# Patient Record
Sex: FEMALE | Race: WHITE | Employment: FULL TIME
[De-identification: names, ages, dates, MRNs, and addresses within clinical notes are randomized per-mention and may not be internally consistent; named-entity substitution may affect disease eponyms.]

---

## 2022-06-14 ENCOUNTER — OFFICE VISIT (OUTPATIENT)
Dept: INTERNAL MEDICINE CLINIC | Facility: CLINIC | Age: 37
End: 2022-06-14
Payer: COMMERCIAL

## 2022-06-14 VITALS
HEART RATE: 85 BPM | HEIGHT: 65 IN | BODY MASS INDEX: 48.82 KG/M2 | TEMPERATURE: 98.8 F | WEIGHT: 293 LBS | SYSTOLIC BLOOD PRESSURE: 160 MMHG | DIASTOLIC BLOOD PRESSURE: 100 MMHG | OXYGEN SATURATION: 96 %

## 2022-06-14 DIAGNOSIS — Z76.89 ENCOUNTER TO ESTABLISH CARE: ICD-10-CM

## 2022-06-14 DIAGNOSIS — R06.81 WITNESSED APNEIC SPELLS: ICD-10-CM

## 2022-06-14 DIAGNOSIS — Z12.4 SCREENING FOR CERVICAL CANCER: ICD-10-CM

## 2022-06-14 DIAGNOSIS — E66.01 MORBID OBESITY WITH BMI OF 50.0-59.9, ADULT (HCC): ICD-10-CM

## 2022-06-14 DIAGNOSIS — Z83.49 FAMILY HISTORY OF THYROID DISEASE: ICD-10-CM

## 2022-06-14 DIAGNOSIS — R53.82 CHRONIC FATIGUE: ICD-10-CM

## 2022-06-14 DIAGNOSIS — R06.83 LOUD SNORING: ICD-10-CM

## 2022-06-14 DIAGNOSIS — Z30.41 ENCOUNTER FOR BIRTH CONTROL PILLS MAINTENANCE: ICD-10-CM

## 2022-06-14 DIAGNOSIS — I10 UNCONTROLLED HYPERTENSION: Primary | ICD-10-CM

## 2022-06-14 DIAGNOSIS — Z82.49 FAMILY HISTORY OF HEART DISEASE: ICD-10-CM

## 2022-06-14 PROCEDURE — 99204 OFFICE O/P NEW MOD 45 MIN: CPT | Performed by: PHYSICIAN ASSISTANT

## 2022-06-14 RX ORDER — OMEPRAZOLE 20 MG/1
20 CAPSULE, DELAYED RELEASE ORAL DAILY
COMMUNITY

## 2022-06-14 RX ORDER — AMLODIPINE BESYLATE 10 MG/1
10 TABLET ORAL DAILY
Qty: 90 TABLET | Refills: 3 | Status: SHIPPED | OUTPATIENT
Start: 2022-06-14 | End: 2022-07-26 | Stop reason: CLARIF

## 2022-06-14 RX ORDER — NORETHINDRONE 0.35 MG
1 KIT ORAL DAILY
COMMUNITY
Start: 2022-06-10 | End: 2022-06-14 | Stop reason: SDUPTHER

## 2022-06-14 RX ORDER — NORETHINDRONE 0.35 MG
1 KIT ORAL DAILY
Qty: 90 TABLET | Refills: 0 | Status: SHIPPED | OUTPATIENT
Start: 2022-06-14 | End: 2022-06-16 | Stop reason: SDUPTHER

## 2022-06-14 RX ORDER — LISINOPRIL 40 MG/1
40 TABLET ORAL DAILY
COMMUNITY
Start: 2022-03-19 | End: 2022-07-26 | Stop reason: CLARIF

## 2022-06-14 RX ORDER — CETIRIZINE HYDROCHLORIDE 10 MG/1
1 CAPSULE, LIQUID FILLED ORAL DAILY
COMMUNITY

## 2022-06-14 RX ORDER — AMLODIPINE BESYLATE 10 MG/1
10 TABLET ORAL DAILY
COMMUNITY
Start: 2022-04-27 | End: 2022-06-14 | Stop reason: SDUPTHER

## 2022-06-14 RX ORDER — VALSARTAN 320 MG/1
320 TABLET ORAL DAILY
Qty: 30 TABLET | Refills: 3 | Status: SHIPPED | OUTPATIENT
Start: 2022-06-14 | End: 2022-09-27 | Stop reason: SDUPTHER

## 2022-06-14 RX ORDER — LISINOPRIL 40 MG/1
40 TABLET ORAL DAILY
Qty: 90 TABLET | Refills: 3 | Status: CANCELLED | OUTPATIENT
Start: 2022-06-14

## 2022-06-14 ASSESSMENT — ENCOUNTER SYMPTOMS
SHORTNESS OF BREATH: 0
APNEA: 1

## 2022-06-14 ASSESSMENT — PATIENT HEALTH QUESTIONNAIRE - PHQ9
2. FEELING DOWN, DEPRESSED OR HOPELESS: 0
SUM OF ALL RESPONSES TO PHQ9 QUESTIONS 1 & 2: 0
SUM OF ALL RESPONSES TO PHQ QUESTIONS 1-9: 0
1. LITTLE INTEREST OR PLEASURE IN DOING THINGS: 0
SUM OF ALL RESPONSES TO PHQ QUESTIONS 1-9: 0

## 2022-06-14 NOTE — PATIENT INSTRUCTIONS
Discontinue Lisinopril 40 mg daily  Start Diovan 320 mg daily in place of Lisinopril  Monitor blood pressure at least 3 times/week and keep record to bring to next appointment  Reviewed the widespread damage that untreated sleep apnea can cause to her health including metabolic issues like weight problems  Suggest OTC aspirin 81 mg daily  Lengthy  regarding dietary changes needed to accomplish weight loss such as elimination of sugary beverages opting instead for zero sodas, light lemonade, or flavored sodas, avoidance of late night meals/snacks (try not to eat past 7 pm), limiting meals to 1 type of carbohydrate per meal, portion control, limited fast foods/eating out, and incorporating protein and/of fiber into meals regularly to help sustain her longer

## 2022-06-14 NOTE — PROGRESS NOTES
refer  Marialuisa Moore (:  1985) is a 40 y.o. female,New patient, here for evaluation of the following chief complaint(s):  Establish Care (Pt wants to discuss possible weight loss medications. )         ASSESSMENT/PLAN:  1. Uncontrolled hypertension  -     amLODIPine (NORVASC) 10 MG tablet; Take 1 tablet by mouth daily, Disp-90 tablet, R-3Normal  -     valsartan (DIOVAN) 320 MG tablet; Take 1 tablet by mouth daily, Disp-30 tablet, R-3Normal  -     [facility]  -     CBC with Auto Differential; Future  -     Comprehensive Metabolic Panel; Future  -     Lipid Panel; Future  -     CT CARDIAC CALCIUM SCORING; Future  2. Encounter to establish care  3. Encounter for birth control pills maintenance  -     SHAROBEL 0.35 MG tablet; Take 1 tablet by mouth daily, Disp-90 tablet, R-0, DAWNormal  4. Screening for cervical cancer  -     [address]  5. Loud snoring  -     [facility]  6. Witnessed apneic spells  -     [facility]  7. Morbid obesity with BMI of 50.0-59.9, adult ([address]  -      [facility]  -     Hemoglobin A1C; Future  -     TSH; Future  -     CT CARDIAC CALCIUM SCORING; Future  8. Family history of heart disease  -     Lipid Panel; Future  -     CT CARDIAC CALCIUM SCORING; Future  9. Chronic fatigue  -     [address] [facility]  -     CBC with Auto Differential; Future  -     TSH; Future  -     T4, Free; Future  -     Vitamin B12; Future  -     Vitamin D 25 Hydroxy; Future  10. Family history of thyroid disease  -     TSH;  Future  -     T4, Free; Future      Patient Instructions   Discontinue Lisinopril 40 mg daily  Start Diovan 320 mg daily in place of Lisinopril  Monitor blood pressure at least 3 times/week and keep record to bring to next appointment  Reviewed the widespread damage that untreated sleep apnea can cause to her health including metabolic issues like weight problems  Suggest OTC aspirin 81 mg daily  Lengthy  regarding dietary changes needed to accomplish weight loss such as elimination of sugary beverages opting instead for zero sodas, light lemonade, or flavored sodas, avoidance of late night meals/snacks (try not to eat past 7 pm), limiting meals to 1 type of carbohydrate per meal, portion control, limited fast foods/eating out, and incorporating protein and/of fiber into meals regularly to help sustain her longer          Return in about 2 months (around 8/14/2022) for routine f/u. Subjective   SUBJECTIVE/OBJECTIVE:  HPI  The patient is a 40 y.o. female who is seen for follow-up of hypertension. She is not exercising and is not adherent to low salt diet. Daily caffiene intake: a known amount (none). Current medication regimen consists of: Lisinopril 40 mg daily + Amlodipine 10 mg daily. Blood pressure is not measured at home. BP Readings from Last 3 Encounters:   06/14/22 (!) 150/94       Additional concerns addressed today include obesity. She describes lowest weight post having children was 200 pounds before meeting her long term boyfriend and eating out more often. She recalls working 2 jobs that were physical and not having time to eat much at the time she weighed 200 lbs. 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  She reports weight has been stable the past year or so with 5 pounds gained and lost repeatedly. Her recent efforts have involved increasing her activity level by adding more steps into her day but admits no energy to exercise due to chronic fatigue. Boyfriend reports loud snoring, witnessed apneic episodes (6-8 second pauses), and frequent dozing off during the day - riding in car or watching TV. Patient describes waking up feeling un-refreshed.       Review of Systems Constitutional: Positive for fatigue (chronic). Negative for unexpected weight change. Respiratory: Positive for apnea. Negative for shortness of breath. Cardiovascular: Negative for chest pain, palpitations and leg swelling. Neurological: Negative for dizziness and headaches. Psychiatric/Behavioral: Negative for sleep disturbance. BP (!) 150/94 (Site: Left Upper Arm, Position: Sitting, Cuff Size: Large Adult)   Pulse 85   Temp 98.8 °F (37.1 °C) (Temporal)   Ht 5' 5\" (1.651 m)   Wt (!) 338 lb (153.3 kg)   SpO2 96%   BMI 56.25 kg/m²     BP (!) 160/100   Pulse 85   Temp 98.8 °F (37.1 °C) (Temporal)   Ht 5' 5\" (1.651 m)   Wt (!) 338 lb (153.3 kg)   SpO2 96%   BMI 56.25 kg/m²       Objective   Physical Exam  Constitutional:       Appearance: Normal appearance. She is obese. HENT:      Head: Normocephalic and atraumatic. Eyes:      Conjunctiva/sclera: Conjunctivae normal.      Pupils: Pupils are equal, round, and reactive to light. Neck:      Vascular: No carotid bruit. Cardiovascular:      Rate and Rhythm: Normal rate and regular rhythm. Heart sounds: Normal heart sounds. Pulmonary:      Effort: Pulmonary effort is normal.      Breath sounds: Normal breath sounds. Musculoskeletal:         General: Normal range of motion. Cervical back: Normal range of motion. Skin:     General: Skin is warm and dry. Neurological:      Mental Status: She is alert and oriented to person, place, and time. Psychiatric:         Mood and Affect: Mood normal.         Behavior: Behavior normal.         Thought Content: Thought content normal.         Judgment: Judgment normal.        [place] Sleepiness Scale  Total Score: 19    On this date 6/14/2022 I have spent 45 minutes reviewing previous notes, test results and face to face with the patient discussing the diagnosis and importance of compliance with the treatment plan as well as documenting on the day of the visit.       An no

## 2022-06-14 NOTE — Clinical Note
Please call patient and do [place] Sleepines Scale over the phone. Also contact radiology and find out what the terminology for CT Heart now? I cannot find it anywhere.

## 2022-06-15 ENCOUNTER — TELEPHONE (OUTPATIENT)
Dept: INTERNAL MEDICINE CLINIC | Facility: CLINIC | Age: 37
End: 2022-06-15

## 2022-06-15 DIAGNOSIS — Z30.41 ENCOUNTER FOR BIRTH CONTROL PILLS MAINTENANCE: ICD-10-CM

## 2022-06-15 NOTE — TELEPHONE ENCOUNTER
Received notice from [facility] that Sharobel 0.35 mg tablets are on backorder and that do no not stock it at their warehouse. They are requesting a replacement drug or for us to send to a different pharmacy.

## 2022-06-16 RX ORDER — ACETAMINOPHEN AND CODEINE PHOSPHATE 120; 12 MG/5ML; MG/5ML
1 SOLUTION ORAL DAILY
Qty: 90 TABLET | Refills: 0 | Status: SHIPPED | OUTPATIENT
Start: 2022-06-16

## 2022-06-16 NOTE — TELEPHONE ENCOUNTER
Adolfo Curtis, it looks like the patient requested this particular version. Please notify her of this feedback and advise that she can call around to various pharmacies and see if anyone has it in stock or can get it for her. If so, she can have prescription transferred.

## 2022-06-16 NOTE — TELEPHONE ENCOUNTER
Pt states that her insurance only pays for her to use [facility]. She would like to know if you can change the RX. She has only been on this medication for about 8-10 months and has never had any issues with any birth control medications.

## 2022-06-27 ENCOUNTER — NURSE ONLY (OUTPATIENT)
Dept: INTERNAL MEDICINE CLINIC | Facility: CLINIC | Age: 37
End: 2022-06-27

## 2022-06-27 DIAGNOSIS — I10 UNCONTROLLED HYPERTENSION: ICD-10-CM

## 2022-06-27 DIAGNOSIS — E66.01 MORBID OBESITY WITH BMI OF 50.0-59.9, ADULT (HCC): ICD-10-CM

## 2022-06-27 DIAGNOSIS — Z82.49 FAMILY HISTORY OF HEART DISEASE: ICD-10-CM

## 2022-06-27 DIAGNOSIS — Z83.49 FAMILY HISTORY OF THYROID DISEASE: ICD-10-CM

## 2022-06-27 DIAGNOSIS — R53.82 CHRONIC FATIGUE: ICD-10-CM

## 2022-06-27 LAB
ALBUMIN SERPL-MCNC: 3.6 G/DL (ref 3.5–5)
ALBUMIN/GLOB SERPL: 0.9 {RATIO} (ref 1.2–3.5)
ALP SERPL-CCNC: 55 U/L (ref 50–136)
ALT SERPL-CCNC: 45 U/L (ref 12–65)
ANION GAP SERPL CALC-SCNC: 8 MMOL/L (ref 7–16)
AST SERPL-CCNC: 30 U/L (ref 15–37)
BASOPHILS # BLD: 0 K/UL (ref 0–0.2)
BASOPHILS NFR BLD: 1 % (ref 0–2)
BILIRUB SERPL-MCNC: 0.6 MG/DL (ref 0.2–1.1)
BUN SERPL-MCNC: 8 MG/DL (ref 6–23)
CALCIUM SERPL-MCNC: 8.9 MG/DL (ref 8.3–10.4)
CHLORIDE SERPL-SCNC: 105 MMOL/L (ref 98–107)
CHOLEST SERPL-MCNC: 163 MG/DL
CO2 SERPL-SCNC: 26 MMOL/L (ref 21–32)
CREAT SERPL-MCNC: 1 MG/DL (ref 0.6–1)
DIFFERENTIAL METHOD BLD: ABNORMAL
EOSINOPHIL # BLD: 0.2 K/UL (ref 0–0.8)
EOSINOPHIL NFR BLD: 3 % (ref 0.5–7.8)
ERYTHROCYTE [DISTWIDTH] IN BLOOD BY AUTOMATED COUNT: 15.1 % (ref 11.9–14.6)
GLOBULIN SER CALC-MCNC: 3.9 G/DL (ref 2.3–3.5)
GLUCOSE SERPL-MCNC: 105 MG/DL (ref 65–100)
HCT VFR BLD AUTO: 43.5 % (ref 35.8–46.3)
HDLC SERPL-MCNC: 32 MG/DL (ref 40–60)
HDLC SERPL: 5.1 {RATIO}
HGB BLD-MCNC: 13 G/DL (ref 11.7–15.4)
IMM GRANULOCYTES # BLD AUTO: 0 K/UL (ref 0–0.5)
IMM GRANULOCYTES NFR BLD AUTO: 0 % (ref 0–5)
LDLC SERPL CALC-MCNC: 106.2 MG/DL
LYMPHOCYTES # BLD: 2.3 K/UL (ref 0.5–4.6)
LYMPHOCYTES NFR BLD: 36 % (ref 13–44)
MCH RBC QN AUTO: 24.7 PG (ref 26.1–32.9)
MCHC RBC AUTO-ENTMCNC: 29.9 G/DL (ref 31.4–35)
MCV RBC AUTO: 82.5 FL (ref 79.6–97.8)
MONOCYTES # BLD: 0.6 K/UL (ref 0.1–1.3)
MONOCYTES NFR BLD: 9 % (ref 4–12)
NEUTS SEG # BLD: 3.3 K/UL (ref 1.7–8.2)
NEUTS SEG NFR BLD: 52 % (ref 43–78)
NRBC # BLD: 0 K/UL (ref 0–0.2)
PLATELET # BLD AUTO: 303 K/UL (ref 150–450)
PMV BLD AUTO: 11 FL (ref 9.4–12.3)
POTASSIUM SERPL-SCNC: 4.4 MMOL/L (ref 3.5–5.1)
PROT SERPL-MCNC: 7.5 G/DL (ref 6.3–8.2)
RBC # BLD AUTO: 5.27 M/UL (ref 4.05–5.2)
SODIUM SERPL-SCNC: 139 MMOL/L (ref 136–145)
T4 FREE SERPL-MCNC: 1.1 NG/DL (ref 0.78–1.46)
TRIGL SERPL-MCNC: 124 MG/DL (ref 35–150)
TSH, 3RD GENERATION: 1.19 UIU/ML (ref 0.36–3.74)
VIT B12 SERPL-MCNC: 223 PG/ML (ref 193–986)
VLDLC SERPL CALC-MCNC: 24.8 MG/DL (ref 6–23)
WBC # BLD AUTO: 6.4 K/UL (ref 4.3–11.1)

## 2022-06-28 LAB
25(OH)D3 SERPL-MCNC: 16.5 NG/ML (ref 30–100)
EST. AVERAGE GLUCOSE BLD GHB EST-MCNC: 148 MG/DL
HBA1C MFR BLD: 6.8 % (ref 4.2–6.3)

## 2022-06-29 ENCOUNTER — HOSPITAL ENCOUNTER (OUTPATIENT)
Dept: CT IMAGING | Age: 37
Discharge: HOME | End: 2022-07-02
Payer: COMMERCIAL

## 2022-06-29 DIAGNOSIS — I10 UNCONTROLLED HYPERTENSION: ICD-10-CM

## 2022-06-29 DIAGNOSIS — E66.01 MORBID OBESITY WITH BMI OF 50.0-59.9, ADULT (HCC): ICD-10-CM

## 2022-06-29 DIAGNOSIS — Z82.49 FAMILY HISTORY OF HEART DISEASE: ICD-10-CM

## 2022-06-29 PROCEDURE — 75571 CT HRT W/O DYE W/CA TEST: CPT

## 2022-07-05 ENCOUNTER — OFFICE VISIT (OUTPATIENT)
Dept: GYNECOLOGY | Age: 37
End: 2022-07-05
Payer: COMMERCIAL

## 2022-07-05 VITALS
SYSTOLIC BLOOD PRESSURE: 132 MMHG | WEIGHT: 293 LBS | DIASTOLIC BLOOD PRESSURE: 78 MMHG | BODY MASS INDEX: 48.82 KG/M2 | HEIGHT: 65 IN

## 2022-07-05 DIAGNOSIS — Z12.4 SCREENING FOR MALIGNANT NEOPLASM OF CERVIX: ICD-10-CM

## 2022-07-05 DIAGNOSIS — Z01.419 WELL WOMAN EXAM WITH ROUTINE GYNECOLOGICAL EXAM: Primary | ICD-10-CM

## 2022-07-05 PROCEDURE — 99385 PREV VISIT NEW AGE 18-39: CPT | Performed by: OBSTETRICS & GYNECOLOGY

## 2022-07-05 RX ORDER — CHOLECALCIFEROL (VITAMIN D3) 1250 MCG
CAPSULE ORAL
COMMUNITY

## 2022-07-05 RX ORDER — UBIDECARENONE 75 MG
50 CAPSULE ORAL DAILY
COMMUNITY

## 2022-07-05 NOTE — PROGRESS NOTES
Sally Marino  is a 40 y.o. No obstetric history on file. who is here for an annual exam.  Complaints: Been getting BC on line for irregular periods. More recently she is seeing a PCP who wrote a prescription for birth control pills. She is also being worked up for sleep apnea. Health Maintenance:    Mammogram:  Bone Density  Colonoscopy  Pap Smear: At least 5 years ago          History  No flowsheet data found. Past Medical History:   Diagnosis Date    GERD (gastroesophageal reflux disease)     Hypertension     Perennial allergic rhinitis with seasonal variation      Past Surgical History:   Procedure Laterality Date     SECTION      OTHER SURGICAL HISTORY  2007    Staph infection due to C section incision opening; but on wound vac     Current Outpatient Medications on File Prior to Visit   Medication Sig Dispense Refill    Cholecalciferol (VITAMIN D3) 1.25 MG (84188 UT) CAPS Take by mouth      vitamin B-12 (CYANOCOBALAMIN) 100 MCG tablet Take 50 mcg by mouth daily      norethindrone (SHAROBEL) 0.35 MG tablet Take 1 tablet by mouth daily 90 tablet 0    Cetirizine HCl (ZYRTEC ALLERGY) 10 MG CAPS Take 1 capsule by mouth daily      omeprazole (PRILOSEC) 20 MG delayed release capsule Take 20 mg by mouth daily      amLODIPine (NORVASC) 10 MG tablet Take 1 tablet by mouth daily 90 tablet 3    valsartan (DIOVAN) 320 MG tablet Take 1 tablet by mouth daily 30 tablet 3    lisinopril (PRINIVIL;ZESTRIL) 40 MG tablet Take 40 mg by mouth daily (Patient not taking: Reported on 2022)       No current facility-administered medications on file prior to visit.      No Known Allergies  Social History     Tobacco Use    Smoking status: Never Smoker    Smokeless tobacco: Never Used   Substance Use Topics    Alcohol use: Not Currently     Family History   Problem Relation Age of Onset    Hypertension Mother     Thyroid Disease Mother         Hypothyroidism    Hypertension Father     Coronary Art Dis Father 62    Hypertension Maternal Grandmother     Thyroid Disease Maternal Grandmother         Hypothyroidism    Hypertension Maternal Grandfather     Hypertension Paternal Aunt     Hypertension Paternal Uncle            Review of Systems  All ROS negative except what's noted in HPI    Constitutional:  Denies weight gain, unexplained weight loss or heat or cold intolerance  ENT: Denies change in vision, change in hearing, frequent headaches  Cardiovascular:  Denies chest pain, swelling in legs or feet, shortness of breath when lying flat  Respiratory:  Denies shortness of breath, cough greater than 2 weeks or coughing up blood  Gastro: Denies diarrhea greater than 2 weeks, rectal bleeding, bloody stools, heartburn, or constipation  :  Denies blood in urine, getting up more than twice at night to urinate, dysuria or incontinence  Breast:  Denies nipple discharge, masses or pain  Skin:  Denies rash greater than 2 weeks, change in moles  Musculoskeletal/Neuro:  Denies joint pain, muscle weakness, seizures, loss of balance or frequent falls  Psych:  Denies frequent crying spells or severe anxiety  Heme:  Denies easy bruising, bleeding gums, frequent nosebleeds or swollen lymph nodes  GYN:  Denies bleeding or spotting between menses, heavy menses, menses longer than 7 days, pain with sex, severe menstrual cramps. Social:  Feels safe in her home. Denies ever having been threatened or hit by a family member                  Physical Exam  Blood pressure 132/78, height 5' 5\" (1.651 m), weight (!) 342 lb (155.1 kg), last menstrual period 06/01/2022. Body mass index is 56.91 kg/m². Lab Results   Component Value Date/Time    HGB 13.0 06/27/2022 01:47 PM      @[employer]([id number])@  @[employer]([id number])@    [facility] unremarkable. EOMI. THOMAS. Sclera non-icteric. Neck is supple without thyromegaly or nodes. Chest clear to auscultation. Bilateral breath sounds equal.    Heart regular rate and rhythm with no murmur, rub or gallop. Breast exam reveals no masses or nipple discharge. No axillary notes are palpable. Abdomen is benign without organomegally. BUS is normal.    Cervix is  present. Pap smear was performed. Bimanual exam reveals no masses. Assessment  40 y.o. No obstetric history on file. for annual exam.  Encounter Diagnoses   Name Primary?     Well woman exam with routine gynecological exam Yes    Screening for malignant neoplasm of cervix        Plan  Bob Brady was seen today for gynecologic exam.    Diagnoses and all orders for this visit:    Well woman exam with routine gynecological exam    Screening for malignant neoplasm of cervix  -     PAP LB, Reflex HPV ASCUS        pap smear done  return annually or prn

## 2022-07-06 LAB
CYTOLOGIST CVX/VAG CYTO: NORMAL
CYTOLOGY CVX/VAG DOC THIN PREP: NORMAL
HPV REFLEX: NORMAL
Lab: NORMAL
PATH REPORT.FINAL DX SPEC: NORMAL
STAT OF ADQ CVX/VAG CYTO-IMP: NORMAL

## 2022-07-13 ENCOUNTER — HOSPITAL ENCOUNTER (OUTPATIENT)
Dept: SLEEP CENTER | Age: 37
Discharge: HOME | End: 2022-07-13
Payer: COMMERCIAL

## 2022-07-13 PROCEDURE — 95811 POLYSOM 6/>YRS CPAP 4/> PARM: CPT

## 2022-07-25 NOTE — PROGRESS NOTES
[address] [place]. [address], [address] [facility]  [phone]    Patient Name:  Gisell Regalado  YOB: 1985      Office Visit 7/26/2022    CHIEF COMPLAINT:    Chief Complaint   Patient presents with    Sleep Apnea       HISTORY OF PRESENT ILLNESS:      This is a pleasant 44-year-old female seen in outpatient consultation at the request of ALISON Agee for evaluation management of sleep apnea. The patient has a longstanding history of being overweight. Over the past several years she has gained increased amounts of weight and with it has developed increasing hypersomnia. Because of the symptoms she was ordered a split-night polysomnography. The diagnostic portion of the polysomnography was notable for evidence of very severe obstructive sleep apnea with an AHI of 141.5 and desaturations as low as 71%. This occurred in the absence of REM sleep suggesting that had REM been present, her disease would be more severe. The arousal index during the diagnostic portion was 168/h. A subsequent CPAP to BiPAP titration was performed. CPAP was initiated but rapidly changed to bilevel pressure and it was titrated up to a level of 17/9 cm water. At that level her disordered breathing was for the most part corrected with her lying on her left side. Her oxygenation improved and the lowest sat recorded at the optimal setting was 89%. She did go into REM sleep during the BiPAP titration twice. As noted, the patient has a long history of being overweight. Her BMI is currently running 57.2. I discussed with her the importance of weight loss in the management of her sleep apnea as well as her overall health. I will provide her with our Wheat Belly diet handout for review. She is encouraged to follow a low glycemic index diet and maintain the BiPAP on a nightly basis as long as possible.   She is counseled that as she loses weight she may develop more mask leaking and we may need to modify her mask down the road to account for that. The patient did have a TSH level drawn recently which was normal suggesting that her morbid obesity and hypersomnia are not related to thyroid dysfunction. EPWORTH SCALE:    SPLIT NIGHT 22        Component Ref Range & Units 22 1347    TSH, 3RD GENERATION 0.358 - 3.740 uIU/mL 1.190    Resulting Agency  [facility]         Past Medical History:   Diagnosis Date    GERD (gastroesophageal reflux disease)     Hypertension     Perennial allergic rhinitis with seasonal variation          [unfilled]      Past Surgical History:   Procedure Laterality Date     SECTION      OTHER SURGICAL HISTORY  2007    Staph infection due to C section incision opening; but on wound vac       [unfilled]    Social History     Socioeconomic History    Marital status: Single     Spouse name: Not on file    Number of children: Not on file    Years of education: Not on file    Highest education level: Not on file   Occupational History    Not on file   Tobacco Use    Smoking status: Never    Smokeless tobacco: Never   Vaping Use    Vaping Use: Never used   Substance and Sexual Activity    Alcohol use: Not Currently    Drug use: Not Currently     Types: Marijuana (Татьяна Bad), Methamphetamines (Crystal Meth)     Comment: 2004     Sexual activity: Yes     Partners: Male     Birth control/protection: Pill   Other Topics Concern    Not on file   Social History Narrative    Feels safe at home.      Social Determinants of Health     Financial Resource Strain: Not on file   Food Insecurity: Not on file   Transportation Needs: Not on file   Physical Activity: Not on file   Stress: Not on file   Social Connections: Not on file   Intimate Partner Violence: Not on file   Housing Stability: Not on file         Family History   Problem Relation Age of Onset    Hypertension Mother     Thyroid Disease Mother         Hypothyroidism    Hypertension Father Coronary Art Dis Father 62    Hypertension Maternal Grandmother     Thyroid Disease Maternal Grandmother         Hypothyroidism    Hypertension Maternal Grandfather     Hypertension Paternal Aunt     Hypertension Paternal Uncle          No Known Allergies      Current Outpatient Medications   Medication Sig    Cholecalciferol (VITAMIN D3) 1.25 MG (33155 UT) CAPS Take by mouth    vitamin B-12 (CYANOCOBALAMIN) 100 MCG tablet Take 50 mcg by mouth daily    norethindrone (SHAROBEL) 0.35 MG tablet Take 1 tablet by mouth daily    Cetirizine HCl (ZYRTEC ALLERGY) 10 MG CAPS Take 1 capsule by mouth daily    omeprazole (PRILOSEC) 20 MG delayed release capsule Take 20 mg by mouth daily    amLODIPine (NORVASC) 10 MG tablet Take 1 tablet by mouth daily    valsartan (DIOVAN) 320 MG tablet Take 1 tablet by mouth daily    lisinopril (PRINIVIL;ZESTRIL) 40 MG tablet Take 40 mg by mouth daily (Patient not taking: Reported on 7/5/2022)     No current facility-administered medications for this visit. REVIEW OF SYSTEMS:   CONSTITUTIONAL:   There is no history of fever, chills, night sweats, weight loss; she has had weight gain, persistent fatigue, and lethargy/hypersomnolence. EYES:   Denies problems with eye pain, erythema, blurred vision, or visual field loss. ENTM:   Denies history of tinnitus, epistaxis, sore throat, hoarseness, or dysphonia. LYMPH:   Denies swollen glands. CARDIAC:   No chest pain, pressure, discomfort, palpitations, orthopnea, murmurs; she has had lower extremity edema. GI:   No dysphagia, heartburn reflux, nausea/vomiting, diarrhea, abdominal pain, or bleeding. :   Denies history of dysuria, hematuria, polyuria, or decreased urine output. MS:   No history of myalgias, arthralgias, bone pain, or muscle cramps. SKIN:   No history of rashes, jaundice, cyanosis, nodules, or ulcers. ENDO:   Negative for heat or cold intolerance. No history of DM.    PSYCH:   Negative for anxiety, a side sleep position is preferred. 2. Morbid obesity with BMI of 50.0-59.9, adult ([facility]  E66.01 The patient's BMI is about 57. Weight loss her caloric restriction particular of simple carbohydrates is essential.  Increase physical activity is also needed to help lose the weight. The Wheat Belly diet handout is provided. There are cookbooks associated with this diet that may be helpful for her to get to improve her chances of weight loss over time. Z68.43       3. Hypersomnia  G47.10 The patient's [place] score today is 22/24 consistent with very severe hypersomnia. The patient did notice that she felt better for several hours after her sleep study including the BiPAP titration. This suggests that she will have a good result in the future. 4. Hypertension, unspecified type  I10 The patient's blood pressure may improve significantly and on culture defer to hospital 1] 9 months status              PLAN:      Begin BiPAP at 17/9 cmH2O with an EPR of 1 and a 20-minute ramp beginning from 8 cmH2O. A small ResMed F 20 mask was used for the study and was tolerated by the patient. Weight loss through caloric restriction and increase physical activity as above. She is provided our [employer] handout for review. Follow-up will be in about 4 months. We are arranging for her to get her BiPAP machine in the next day or 2 given the severity of her disease and hypersomnia.         Orders Placed This Encounter   Procedures    DME - DURABLE MEDICAL EQUIPMENT     [facility]  Phone: [phone] [address]  Dept: [phone]      Patient Name: Max Saleh  : 1985  Gender: female  Address: [address] [place] [address]  Patient phone number: [phone] (home)       Primary Insurance: Payor: Gavino Porter / Plan: Gavino Porter / Product Type: *No Product type* /   Subscriber ID: [id number] - (Commercial)      AMB Supply Order  Order Details     DME Location: [address] [place] [address]   Order Date: 7/26/2022   Diagnoses of SARBJIT (obstructive sleep apnea), Morbid obesity with BMI of 50.0-59.9, adult ([address] Hypersomnia, and Hypertension, unspecified type were pertinent to this visit. (  X   )New Set-Up    machine   (     ) CPAP Unit  (     ) Auto CPAP Unit  (   x  ) BiLevel Unit  (     ) Auto BiLevel Unit  (     ) ASV   (     ) Bilevel ST    (     ) Oxygen Concentrator         Length of need: 12 months    Pressure: 17/9  cmH20  EPR: 1     Starting Ramp Pressure:  8 cm H20  Ramp Time: min  20    Patient had a diagnostic Apnea Hypopnea Index (AHI) of :  141.5    *SUPPLIES* Replace all as needed, or per coverage guidelines     Masks Type:    (  x   ) -Full Face Mask (1 per 3 mon) <<< Small Resmed F20 >>>   ( x    ) -Full Mask (1 per month) Interface/Cushion      (     ) -Nasal Mask (1 per 3 mon)  (     ) - Nasal Mask (1 per month) Interface/Cushion  (     ) -Pillow (2 per mon)  (     ) -Sclpdlasr (1 per 6 mon)      _________________________________________________________________          Other Supplies:    (  X   )-Slptexyj (1 per 6 mon)  ( X    )-Pyscdi Tubing (1 per 3 mon)  (  X   )- Disposable Filter (2 per mon)  (   X  )-Toklpx Humidifier (1 per year)     (  x   )-Hsyddcoxp (sometimes used with Full Face Mask) (1 per 6 mos)  (     )-Tubing-without heat (1 per 3 mos)  ( X   )-Non-Disposable Filter (1 per 6 mos)  (   x  )-Water Chamber (1 per 6 mos)  (     )-Humidifier non-heated (1 per 5 yrs)      Signed Date: 7/26/2022  Electronically Signed By: Robert Puga MD        No orders of the defined types were placed in this encounter.           Robert Puga MD  Electronically signed    Over 50% of today's office visit was spent in face to face time reviewing test results, prognosis, importance of compliance, education about disease process, benefits of medications, instructions for management of acute flare-ups, and follow up plans. Total face to face time spent with the patient and charting was 41 minutes. Dictated using voice recognition software.   Proof read but unrecognized errors may exist.

## 2022-07-26 ENCOUNTER — OFFICE VISIT (OUTPATIENT)
Dept: SLEEP MEDICINE | Age: 37
End: 2022-07-26
Payer: COMMERCIAL

## 2022-07-26 VITALS
WEIGHT: 293 LBS | TEMPERATURE: 98.4 F | BODY MASS INDEX: 48.82 KG/M2 | HEART RATE: 84 BPM | DIASTOLIC BLOOD PRESSURE: 70 MMHG | SYSTOLIC BLOOD PRESSURE: 140 MMHG | HEIGHT: 65 IN | OXYGEN SATURATION: 96 % | RESPIRATION RATE: 16 BRPM

## 2022-07-26 DIAGNOSIS — G47.10 HYPERSOMNIA: ICD-10-CM

## 2022-07-26 DIAGNOSIS — I10 HYPERTENSION, UNSPECIFIED TYPE: ICD-10-CM

## 2022-07-26 DIAGNOSIS — G47.33 OSA (OBSTRUCTIVE SLEEP APNEA): ICD-10-CM

## 2022-07-26 DIAGNOSIS — E66.01 MORBID OBESITY WITH BMI OF 50.0-59.9, ADULT (HCC): ICD-10-CM

## 2022-07-26 PROCEDURE — 99204 OFFICE O/P NEW MOD 45 MIN: CPT | Performed by: INTERNAL MEDICINE

## 2022-07-26 ASSESSMENT — PATIENT HEALTH QUESTIONNAIRE - PHQ9
2. FEELING DOWN, DEPRESSED OR HOPELESS: 0
1. LITTLE INTEREST OR PLEASURE IN DOING THINGS: 0
SUM OF ALL RESPONSES TO PHQ9 QUESTIONS 1 & 2: 0
SUM OF ALL RESPONSES TO PHQ QUESTIONS 1-9: 0

## 2022-07-26 NOTE — PATIENT INSTRUCTIONS
WHAT IS THE WHEAT BELLY DIET? Wheat Belly: Lose the Wheat, Lose the Weight, and Find Your Path Back to Health is the book by the renowned cardiologist, Dr. Nery Sandra, which explains how eliminating wheat from our diets can result in numerous health benefits, including weight loss. Wheat Belly is really about two things:     1) eliminating wheat (and other gluten-containing grains such as barley and rye), and   2) managing carbohydrates/sugars to individual tolerance levels to manage blood sugar and promote weight-loss       The theory is that wheat promotes high blood sugar which though a series of reactions, causes the body to accumulate more visceral fat. The Plan   Wheat isn't the only bad boy in this diet. Many other foods are either eliminated or eaten in limited quantities such as fruit, starchy veggies, whole grains, legumes, dried fruit, corn starch and corn meal. Three meals a day are encouraged without any snacks. So what can you eat? Heres the basic list:     In unlimited quantities:   Veggies (except potatoes and corn)   Raw nuts and seeds   Oils   Meats and eggs   Cheese   Non-sugary condiments (like mustard and salsa)   Ground flaxseed   Avocados, olives, coconut   Spices   Unsweetened cocoa        In limited quantities:   Dairy (except cheese)   Fruit   Whole corn (like on the cob)   Fruit juice   Non-wheat, non-gluten grains (like quinoa, amaranth, and rice)   Beans, peas, and lentils   Soy products (fermented soy products preferred)  Unlike other diet plans, there is basically one phase: toss out all your wheat foods and go cold turkey. Once you go wheat free, the author claims that the pounds will come right off and youll immediately feel better. Fasting, defined as drinking water only, is highly recommended for several days or even weeks at a time. How to Eat a Wheat Belly Diet in a Healthy Way  Best Wheat Belly Foods to Eat:   All varieties of fresh vegetables, especially those that are non-starchy and low in calories. These include things like cruciferous veggies (broccoli or [place] sprouts, for example), leafy greens, peppers, mushrooms, asparagus, artichoke, etc.   Fresh fruit (but not processed juices), including berries, apples, melon, and citrus fruits like grapefruit or oranges. Some people prefer to eat mostly low-sugar fruits but avoid those higher in sugar like pineapple, papaya, clarice or banana. Healthy fats like coconut oil or olive oil, raw nuts and seeds, avocado, coconut milk, olives, cocoa butter, and grass-fed butter or ghee. Grass-fed, humanely raised meat and eggs, plus wild-caught fish. Full-fat cheeses (ideally made from raw, organic milk). Fermented foods like unsweetened kefir or yogurt, pickled or cultured vegetables, and in moderation tofu, tempeh, miso and natto. If theyre well-tolerated, unprocessed grains in moderation, including quinoa, millet, buckwheat (not actually a type of wheat), brown rice and amaranth. Worst Wheat Belly Foods to Avoid:    Eating a wheat belly diet means avoiding anything made with the grains wheat, barley, rye, spelt or certain oats. Additionally, Riya Turenr recommends avoiding added sugar, condiments that include synthetic or chemically altered ingredients, sugary drinks and other processed foods as much as possible. Below are the main foods to exclude from your diet if you choose to try following this dietary plan:  Grain-based desserts, including both packaged or homemade cakes, cookies, donuts, pies, crisps, cobblers, and granola bars   Breads, especially those made with refined wheat flour. Even many gluten-free breads or packaged products should not contribute many calories to your diet.  While products made from grains other than wheat (like corn or rice) might be free of gluten, theyre still usually not very nutrient-dense and are inferior to eating whole, sprouted ancient grains like oats, quinoa, wild rice or teff, for example. Plus, modern food-processing techniques usually contaminate these foods with gluten since theyre processed using the same equipment that wheat is. Most cereals   Pizza   Pasta and noodles   Chips and crackers   Wheat tortillas, wraps, burritos and tacos   Fast food   Take-out, including most [place] or [facility] dishes, burgers and deli sandwiches   Breaded proteins like chicken cutlets, processed meats, hot dogs and frozen veggie burgers   Added sugar, including high fructose corn syrup, sucrose, dried fruit, juices and sugary beverage   Processed rice and potato products   Trans fats, fried foods and cured meats  Tips for Giving Up Wheat and Processed Grains:  When grocery shopping, check ingredients carefully and look for products made without wheat, rye and barley. This might mean choosing certified gluten-free items in some cases, although even these can be highly processed. The most substantial sources of wheat in your diet are likely bread or baked products made with wheat flour (like pizza, pasta at restaurants, bread, etc.), so unless specifically noted that these are grain- or gluten-free, assume they contain wheat. If you are going to buy bread, look for sourdough or sprouted grain breads (like John bread), which are usually better tolerated than ordinary wheat-flour breads. When it comes to baking or using flour in recipes, try some of these naturally gluten-free flour alternatives over wheat flour: brown rice, quinoa, chickpea, almond and coconut flour. Remember that wheat is hiding in many condiments, sauces, dressings, etc. Avoid any that contain flour or added sugar, sticking with basic condiments or flavor enhancers like vinegar, herbs, spices and real bone broth. Many types of alcohol, including beer, also contain wheat. Hard liquor and wine are better options, however watch the amount you consume and what you mix these with. The company who will be taking care of your CPAP supplies is:     Address: [address] [place] [address] [address], [place], [address] [place] [address]  Phone: [phone] Option #1  Fax: [phone]

## 2022-08-16 ENCOUNTER — OFFICE VISIT (OUTPATIENT)
Dept: INTERNAL MEDICINE CLINIC | Facility: CLINIC | Age: 37
End: 2022-08-16
Payer: COMMERCIAL

## 2022-08-16 VITALS
BODY MASS INDEX: 48.82 KG/M2 | DIASTOLIC BLOOD PRESSURE: 88 MMHG | WEIGHT: 293 LBS | OXYGEN SATURATION: 98 % | HEIGHT: 65 IN | SYSTOLIC BLOOD PRESSURE: 132 MMHG | HEART RATE: 77 BPM | TEMPERATURE: 98 F

## 2022-08-16 DIAGNOSIS — E66.01 MORBID OBESITY WITH BMI OF 50.0-59.9, ADULT (HCC): ICD-10-CM

## 2022-08-16 DIAGNOSIS — M54.50 PAIN IN LEFT LUMBAR REGION OF BACK: ICD-10-CM

## 2022-08-16 DIAGNOSIS — E55.9 VITAMIN D DEFICIENCY: ICD-10-CM

## 2022-08-16 DIAGNOSIS — I10 PRIMARY HYPERTENSION: ICD-10-CM

## 2022-08-16 DIAGNOSIS — E11.9 TYPE 2 DIABETES MELLITUS WITHOUT COMPLICATION, WITHOUT LONG-TERM CURRENT USE OF INSULIN (HCC): Primary | ICD-10-CM

## 2022-08-16 DIAGNOSIS — E53.8 LOW VITAMIN B12 LEVEL: ICD-10-CM

## 2022-08-16 DIAGNOSIS — E78.5 HYPERLIPIDEMIA LDL GOAL <100: ICD-10-CM

## 2022-08-16 PROCEDURE — 99214 OFFICE O/P EST MOD 30 MIN: CPT | Performed by: PHYSICIAN ASSISTANT

## 2022-08-16 PROCEDURE — 3044F HG A1C LEVEL LT 7.0%: CPT | Performed by: PHYSICIAN ASSISTANT

## 2022-08-16 RX ORDER — METFORMIN HYDROCHLORIDE 500 MG/1
500 TABLET, EXTENDED RELEASE ORAL
Qty: 30 TABLET | Refills: 5 | Status: SHIPPED | OUTPATIENT
Start: 2022-08-16

## 2022-08-16 RX ORDER — NAPROXEN 500 MG/1
TABLET ORAL
Qty: 60 TABLET | Refills: 0 | Status: SHIPPED | OUTPATIENT
Start: 2022-08-16

## 2022-08-16 ASSESSMENT — PATIENT HEALTH QUESTIONNAIRE - PHQ9
SUM OF ALL RESPONSES TO PHQ QUESTIONS 1-9: 0
2. FEELING DOWN, DEPRESSED OR HOPELESS: 0
SUM OF ALL RESPONSES TO PHQ QUESTIONS 1-9: 0
SUM OF ALL RESPONSES TO PHQ9 QUESTIONS 1 & 2: 0
1. LITTLE INTEREST OR PLEASURE IN DOING THINGS: 0

## 2022-08-16 ASSESSMENT — ENCOUNTER SYMPTOMS
BACK PAIN: 1
SHORTNESS OF BREATH: 0

## 2022-08-16 NOTE — PROGRESS NOTES
Osorio Blake (:  1985) is a 40 y.o. female,Established patient, here for evaluation of the following chief complaint(s):  Follow-up (Hypertension, Vit D Def, Diabetes)         ASSESSMENT/PLAN:  1. Type 2 diabetes mellitus without complication, without long-term current use of insulin (HCC)  -     metFORMIN (GLUCOPHAGE XR) 500 MG extended release tablet; Take 1 tablet by mouth daily (with breakfast), Disp-30 tablet, R-5Normal  -     Comprehensive Metabolic Panel; Future  -     Hemoglobin A1C; Future  2. Pain in left lumbar region of back  -     naproxen (NAPROSYN) 500 MG tablet; Take 1 tablet by mouth twice daily with food, Disp-60 tablet, R-0Normal  -     XR LUMBAR SPINE (2-3 VIEWS); Future  3. Vitamin D deficiency  -     Vitamin D 25 Hydroxy; Future  4. Primary hypertension  -     Comprehensive Metabolic Panel; Future  -     CBC with Auto Differential; Future  5. Low vitamin B12 level  -     Vitamin B12; Future  6. Hyperlipidemia LDL goal <100  7.  Morbid obesity with BMI of 50.0-59.9, adult [place] [facility])      Patient Instructions   Trial addition of OTC Melatonin 5-10 mg nightly in hopes of improving sleep rhythm to complete sleep disruption now that sleep apnea is being treated  Start Naproxen 500 mg twice daily with meals x 2-4 weeks  Recommend evaluation and manual adjustment with chiropractor - Jenny Guzman @ [facility] [address]  Start Metformin ER once daily with largest meal  Encouraged to return to healthy eating habits ASAP  Reminded of tremendous benefit to weight loss efforts when exercising regularly  Monitor blood pressure 2-3 times/week and keep record to bring to next appointment  Counseled to reduce intake of high fat/cholesterol foods such as fried foods, red meats, and certain dairy products (cheese, ice cream, butter/margarine, egg yolks, whole milk products) to help reduce cholesterol values   Advised that she will need to get fasting labs drawn at any of the [address] [place] [address] lab locations (outlined on handout provided to patient) approx 1 week prior to scheduled follow-up appointment       Return in about 6 weeks (around 9/27/2022) for diabetes f/u. Subjective   SUBJECTIVE/OBJECTIVE:  The patient is a 40 y.o. female who is seen for follow-up of hypertension. She is not exercising and is not adherent to low salt diet. Daily caffiene intake: a known amount (12-20 oz/day). Current medication regimen consists of: Diovan 320 mg daily + Amlodipine 10 mg daily. Blood pressure is not measured at home. BP Readings from Last 3 Encounters:   08/16/22 (!) 140/100   07/26/22 (!) 140/70   07/05/22 132/78       Patient is here for follow-up of vitamin d deficiency. The current state of this condition is control uncertain and no significant medication side effects noted on OTC vitamin d3 5000 iu daily - taking as prescribed. Vit D, 25-Hydroxy   Date Value Ref Range Status   06/27/2022 16.5 (L) 30.0 - 100.0 ng/mL Final       The patient is a 40 y.o. female who is seen for follow up of diabetes. Current monitoring regimen: patient does not check sugars. Glucose monitoring frequency: 0 times daily  Home blood sugar records: patient does not test  Any episodes of hypoglycemia? no  Known diabetic complications: none  Current diabetic medications include: none. Last HbA1C:    Lab Results   Component Value Date    LABA1C 6.8 (H) 06/27/2022     Lab Results   Component Value Date     06/27/2022         Last Lipid Panel:   Lab Results   Component Value Date    CHOL 163 06/27/2022     Lab Results   Component Value Date    TRIG 124 06/27/2022     Lab Results   Component Value Date    HDL 32 (L) 06/27/2022     Lab Results   Component Value Date    LDLCALC 106.2 (H) 06/27/2022     Lab Results   Component Value Date    LABVLDL 24.8 (H) 06/27/2022     Lab Results   Component Value Date    CHOLHDLRATIO 5.1 06/27/2022       Patient is here for follow-up of vitamin b12 deficiency.   The current state of this condition is control uncertain and no significant medication side effects noted on OTC sublingual vitamin b12 supplement daily - taking as prescribed. Initial levels were determined to be 223 pg/mL in June 2022. Additional concerns addressed today include L lower lumbar back pain exacerbated on recent vacation to the beach which involved a lot of walking and sleeping on a mattress that felt too soft for her. She describes a burning discomfort that radiates around to side of L hip but does not radiate down the leg. She has had more difficulty working because that involves standing on her feet 5-9 hours at a time. She hasn't tried any OTC medications or home remedies yet. Relief is achieved by sitting down to rest or stretching. Review of Systems   Constitutional:  Negative for fatigue. Respiratory:  Negative for shortness of breath. Cardiovascular:  Positive for leg swelling (bilateral). Negative for chest pain and palpitations. Musculoskeletal:  Positive for back pain (L lumbar). Neurological:  Negative for dizziness and headaches. Psychiatric/Behavioral:  Positive for sleep disturbance. BP (!) 140/100 (Site: Left Upper Arm, Position: Sitting, Cuff Size: Large Adult)   Pulse 77   Temp 98 °F (36.7 °C) (Temporal)   Ht 5' 5\" (1.651 m)   Wt (!) 350 lb (158.8 kg)   SpO2 98%   BMI 58.24 kg/m²       /88   Pulse 77   Temp 98 °F (36.7 °C) (Temporal)   Ht 5' 5\" (1.651 m)   Wt (!) 350 lb (158.8 kg)   SpO2 98%   BMI 58.24 kg/m²       Objective   Physical Exam  Constitutional:       Appearance: Normal appearance. She is obese. HENT:      Head: Normocephalic and atraumatic. Eyes:      Conjunctiva/sclera: Conjunctivae normal.      Pupils: Pupils are equal, round, and reactive to light. Neck:      Vascular: No carotid bruit. Cardiovascular:      Rate and Rhythm: Normal rate and regular rhythm. Heart sounds: Normal heart sounds.    Pulmonary: Effort: Pulmonary effort is normal.      Breath sounds: Normal breath sounds. Musculoskeletal:         General: Normal range of motion. Cervical back: Normal range of motion. Skin:     General: Skin is warm and dry. Neurological:      Mental Status: She is alert and oriented to person, place, and time. Psychiatric:         Mood and Affect: Mood normal.         Behavior: Behavior normal.         Thought Content: Thought content normal.         Judgment: Judgment normal.          On this date 8/16/2022 I have spent 30 minutes reviewing previous notes, test results and face to face with the patient discussing the diagnosis and importance of compliance with the treatment plan as well as documenting on the day of the visit. An electronic signature was used to authenticate this note.     --Johnson Baer PA-C

## 2022-08-23 NOTE — PATIENT INSTRUCTIONS
Trial addition of OTC Melatonin 5-10 mg nightly in hopes of improving sleep rhythm to complete sleep disruption now that sleep apnea is being treated  Start Naproxen 500 mg twice daily with meals x 2-4 weeks  Recommend evaluation and manual adjustment with chiropractor - Kiara Mix @ [facility]/ [address]  Start Metformin ER once daily with largest meal  Encouraged to return to healthy eating habits ASAP  Reminded of tremendous benefit to weight loss efforts when exercising regularly  Monitor blood pressure 2-3 times/week and keep record to bring to next appointment  Counseled to reduce intake of high fat/cholesterol foods such as fried foods, red meats, and certain dairy products (cheese, ice cream, butter/margarine, egg yolks, whole milk products) to help reduce cholesterol values   Advised that she will need to get fasting labs drawn at any of the [address] [place] [address] lab locations (outlined on handout provided to patient) approx 1 week prior to scheduled follow-up appointment

## 2022-08-27 DIAGNOSIS — I10 UNCONTROLLED HYPERTENSION: ICD-10-CM

## 2022-08-29 RX ORDER — VALSARTAN 320 MG/1
TABLET ORAL
Qty: 90 TABLET | Refills: 1 | OUTPATIENT
Start: 2022-08-29

## 2022-09-15 DIAGNOSIS — Z30.41 ENCOUNTER FOR BIRTH CONTROL PILLS MAINTENANCE: ICD-10-CM

## 2022-09-15 RX ORDER — ACETAMINOPHEN AND CODEINE PHOSPHATE 120; 12 MG/5ML; MG/5ML
SOLUTION ORAL
Qty: 84 TABLET | OUTPATIENT
Start: 2022-09-15

## 2022-09-27 ENCOUNTER — OFFICE VISIT (OUTPATIENT)
Dept: INTERNAL MEDICINE CLINIC | Facility: CLINIC | Age: 37
End: 2022-09-27
Payer: COMMERCIAL

## 2022-09-27 VITALS
HEART RATE: 83 BPM | TEMPERATURE: 98.4 F | HEIGHT: 65 IN | OXYGEN SATURATION: 98 % | WEIGHT: 293 LBS | DIASTOLIC BLOOD PRESSURE: 84 MMHG | BODY MASS INDEX: 48.82 KG/M2 | SYSTOLIC BLOOD PRESSURE: 150 MMHG

## 2022-09-27 DIAGNOSIS — I10 ELEVATED BLOOD PRESSURE READING IN OFFICE WITH DIAGNOSIS OF HYPERTENSION: Primary | ICD-10-CM

## 2022-09-27 DIAGNOSIS — B34.9 VIRAL ILLNESS: ICD-10-CM

## 2022-09-27 DIAGNOSIS — R43.2 ALTERED TASTE: ICD-10-CM

## 2022-09-27 DIAGNOSIS — E55.9 VITAMIN D DEFICIENCY: ICD-10-CM

## 2022-09-27 DIAGNOSIS — R11.0 NAUSEA: ICD-10-CM

## 2022-09-27 DIAGNOSIS — E11.9 TYPE 2 DIABETES MELLITUS WITHOUT COMPLICATION, WITHOUT LONG-TERM CURRENT USE OF INSULIN (HCC): ICD-10-CM

## 2022-09-27 DIAGNOSIS — E53.8 LOW VITAMIN B12 LEVEL: ICD-10-CM

## 2022-09-27 LAB
EXP DATE SOLUTION: NORMAL
EXP DATE SWAB: NORMAL
HCG QUALITATIVE, POC: NEGATIVE
HCG, PREGNANCY, URINE, POC: NEGATIVE
LOT NUMBER SOLUTION: NORMAL
LOT NUMBER SWAB: NORMAL
SARS-COV-2 RNA, POC: NEGATIVE
VALID INTERNAL CONTROL, POC: YES

## 2022-09-27 PROCEDURE — 99214 OFFICE O/P EST MOD 30 MIN: CPT | Performed by: PHYSICIAN ASSISTANT

## 2022-09-27 PROCEDURE — 84703 CHORIONIC GONADOTROPIN ASSAY: CPT | Performed by: PHYSICIAN ASSISTANT

## 2022-09-27 PROCEDURE — 87635 SARS-COV-2 COVID-19 AMP PRB: CPT | Performed by: PHYSICIAN ASSISTANT

## 2022-09-27 PROCEDURE — 3044F HG A1C LEVEL LT 7.0%: CPT | Performed by: PHYSICIAN ASSISTANT

## 2022-09-27 RX ORDER — VALSARTAN 320 MG/1
320 TABLET ORAL DAILY
Qty: 90 TABLET | Refills: 3 | Status: SHIPPED | OUTPATIENT
Start: 2022-09-27 | End: 2023-09-27

## 2022-09-27 ASSESSMENT — ENCOUNTER SYMPTOMS
SINUS PRESSURE: 0
COUGH: 1
VOMITING: 1
CONSTIPATION: 0
SORE THROAT: 1
WHEEZING: 0
ABDOMINAL PAIN: 1
ABDOMINAL DISTENTION: 0
RHINORRHEA: 1
SHORTNESS OF BREATH: 0
NAUSEA: 1
EYE DISCHARGE: 1
DIARRHEA: 0

## 2022-09-27 ASSESSMENT — PATIENT HEALTH QUESTIONNAIRE - PHQ9
SUM OF ALL RESPONSES TO PHQ9 QUESTIONS 1 & 2: 0
1. LITTLE INTEREST OR PLEASURE IN DOING THINGS: 0
SUM OF ALL RESPONSES TO PHQ QUESTIONS 1-9: 0
2. FEELING DOWN, DEPRESSED OR HOPELESS: 0
SUM OF ALL RESPONSES TO PHQ QUESTIONS 1-9: 0

## 2022-09-27 NOTE — PROGRESS NOTES
Bhavna Sun (:  1985) is a 40 y.o. female,Established patient, here for evaluation of the following chief complaint(s):  Hypertension and Diabetes         ASSESSMENT/PLAN:  1. Elevated blood pressure reading in office with diagnosis of hypertension  -     valsartan (DIOVAN) 320 MG tablet; Take 1 tablet by mouth daily, Disp-90 tablet, R-3Normal  2. Viral illness  -     AMB POC COVID-19 COV  3. Altered taste  -     AMB POC COVID-19 COV  4. Nausea  -     AMB POC GONADOTROPIN, CHORIONIC (HCG); QUALITATIVE  5. Vitamin D deficiency  6. Low vitamin B12 level  7.  Type 2 diabetes mellitus without complication, without long-term current use of insulin (HCC)      Patient Instructions   Start OTC nasal steroid (Flonase or Nasacort) 2 sprays/nostril once daily  Instructed on how to use nasal spray properly for optimal results - insert tip into nostril, angle slightly outward, then squirt according to directions  Advised to eliminate any decongestant if included in her OTC sinus med she's been taking  Focus on bland diet with toast, banana, crackers, and/or chicken noodle soup  Emphasized the importance of rehydrating and maintaining good hydration on a daily basis  Encouraged to get plenty of sleep over the next week to allow body to fully recover  Strive to reduce stressors in her life as these will only continue to worsen her overall health  Monitor blood pressure daily and keep record to bring to next appointment  Continue chronic medications as prescribed  Recommend waiting until October to receive annual flu vaccine to ensure optimal protection during the entire flu season - needs to wait at least 2 weeks after she starts to feel normal again  Needs to get fasting blood work done  Resume consistent use of Metformin ER with largest meal of the day  Counseled that Metformin takes 4-6 weeks to get to therapeutic levels in her body so it's important that she remain consistent with this med  Recommend getting newest COVID vaccine given formulation that should better protect against the most recently circulating Omicron variant      Return pending lab results. Subjective   SUBJECTIVE/OBJECTIVE:  The patient is a 40 y.o. female who is seen for follow up of diabetes. Current monitoring regimen: patient does not check sugars. Glucose monitoring frequency: 0 times daily  Home blood sugar records: patient does not test  Any episodes of hypoglycemia? no  Known diabetic complications: none  Current diabetic medications include: oral agent (monotherapy): Glucophage  mg daily. Current Eye Exam (within one year): No  Current Urine Microalbumin: No  Is She on ACE inhibitor or angiotensin II receptor blocker? Yes - valsartan (Diovan)  Current Foot Exam (within one year): No      Weight trend: increasing steadily  Prior visit with dietician: no  Current diet: meals per day on average: 2-3;  restricting intake of the following: doesn't eat a lot of sweets  Current exercise: no regular exercise        Last HbA1C:    Lab Results   Component Value Date    LABA1C 6.8 (H) 06/27/2022     Lab Results   Component Value Date     06/27/2022           Last Lipid Panel:   Lab Results   Component Value Date    CHOL 163 06/27/2022     Lab Results   Component Value Date    TRIG 124 06/27/2022     Lab Results   Component Value Date    HDL 32 (L) 06/27/2022     Lab Results   Component Value Date    LDLCALC 106.2 (H) 06/27/2022     Lab Results   Component Value Date    LABVLDL 24.8 (H) 06/27/2022     Lab Results   Component Value Date    CHOLHDLRATIO 5.1 06/27/2022       Patient is here for follow-up of vitamin b12 deficiency. The current state of this condition is control uncertain and no significant medication side effects noted on OTC sublingual vitamin b12 supplement daily - taking as prescribed. Previous blood work found vitamin b12 levels to be 223 pg/mL in June 2022.       Patient is here for follow-up of vitamin d deficiency. The current state of this condition is control uncertain and no significant medication side effects noted on OTC vitamin d3 5000 iu daily - taking as prescribed. Vit D, 25-Hydroxy   Date Value Ref Range Status   2022 16.5 (L) 30.0 - 100.0 ng/mL Final       The patient is a 40 y.o. female who is seen for follow-up of hypertension. She is not exercising and is not adherent to low salt diet. Daily caffiene intake: a known amount (none). Current medication regimen consists of: Diovan 320 mg daily + Amlodipine 10 mg daily. Blood pressure is not measured at home. BP Readings from Last 3 Encounters:   22 (!) 140/90   22 132/88   22 (!) 140/70       Additional concerns addressed today include waking up Monday morning with no energy, weakness, body aches, and diffuse severe headache. She found glucose to be elevated around 290s & blood pressure high at 180s/100s during this time. She describes slight headache persisting and sinus symptoms including irritated throat, nasal +/- ear congestion, altered taste, stomach feeling \"empty\" and nausea (waking with it or developing after eating). She took a home COVID test on Tues/Wed which was negative. She recalls receiving very upsetting news the day prior to onset of her symptoms that her co-worker had . She also admits to higher stress levels recently - having to contribute to [employer]. Review of Systems   Constitutional:  Positive for chills and unexpected weight change (gaining). Negative for diaphoresis, fatigue and fever. HENT:  Positive for congestion, postnasal drip, rhinorrhea (after taking sinus medication), sneezing and sore throat (irritated). Negative for ear pain and sinus pressure. Eyes:  Positive for discharge (occasional) and visual disturbance. Respiratory:  Positive for cough (dry). Negative for shortness of breath and wheezing.     Cardiovascular:  Positive for leg swelling (bilateral). Negative for chest pain and palpitations. Gastrointestinal:  Positive for abdominal pain (cramping x 1 day last week), nausea and vomiting (minimal amount yesterday). Negative for abdominal distention, constipation and diarrhea. Endocrine: Negative for polydipsia. Negative for hair loss   Genitourinary:  Negative for frequency. Neurological:  Positive for headaches (slight currently but severe and diffuse last week). Negative for dizziness and numbness. BP (!) 140/90 (Site: Left Upper Arm, Position: Sitting, Cuff Size: Large Adult)   Pulse 83   Temp 98.4 °F (36.9 °C) (Temporal)   Ht 5' 5\" (1.651 m)   Wt (!) 353 lb (160.1 kg)   SpO2 98%   BMI 58.74 kg/m²       BP (!) 150/84   Pulse 83   Temp 98.4 °F (36.9 °C) (Temporal)   Ht 5' 5\" (1.651 m)   Wt (!) 353 lb (160.1 kg)   SpO2 98%   BMI 58.74 kg/m²       Objective   Physical Exam  Constitutional:       Appearance: Normal appearance. She is obese. HENT:      Head: Normocephalic and atraumatic. Right Ear: Tympanic membrane, ear canal and external ear normal.      Left Ear: Tympanic membrane, ear canal and external ear normal.      Nose:      Right Turbinates: Swollen. Left Turbinates: Swollen. Right Sinus: No maxillary sinus tenderness or frontal sinus tenderness. Left Sinus: No maxillary sinus tenderness or frontal sinus tenderness. Mouth/Throat:      Mouth: Mucous membranes are moist.      Pharynx: Oropharynx is clear. Neck:      Vascular: No carotid bruit. Cardiovascular:      Rate and Rhythm: Normal rate and regular rhythm. Heart sounds: Normal heart sounds. Pulmonary:      Effort: Pulmonary effort is normal.      Breath sounds: Normal breath sounds. Musculoskeletal:         General: Normal range of motion. Cervical back: Normal range of motion. Lymphadenopathy:      Head:      Right side of head: No submandibular or tonsillar adenopathy.       Left side of head: No submandibular or tonsillar adenopathy. Skin:     General: Skin is warm and dry. Neurological:      Mental Status: She is alert and oriented to person, place, and time. Psychiatric:         Mood and Affect: Mood normal.         Behavior: Behavior normal.         Thought Content: Thought content normal.         Judgment: Judgment normal.        Results for orders placed or performed in visit on 09/27/22   AMB POC GONADOTROPIN, CHORIONIC (HCG); QUALITATIVE   Result Value Ref Range    Valid Internal Control, POC YES     HCG, Pregnancy, Urine, POC Negative Negative    HCG Qualitative, POC Negative    AMB POC COVID-19 COV   Result Value Ref Range    SARS-COV-2 RNA, POC Negative     Lot number swab [id number]     EXP date swab 12.31.2024     Lot number solution [id number]     EXP date solution 03.23.2024        On this date 9/27/2022 I have spent 30 minutes reviewing previous notes, test results and face to face with the patient discussing the diagnosis and importance of compliance with the treatment plan as well as documenting on the day of the visit. An electronic signature was used to authenticate this note.     --Anibal Akins PA-C

## 2022-09-27 NOTE — PATIENT INSTRUCTIONS
Start OTC nasal steroid (Flonase or Nasacort) 2 sprays/nostril once daily  Instructed on how to use nasal spray properly for optimal results - insert tip into nostril, angle slightly outward, then squirt according to directions  Advised to eliminate any decongestant if included in her OTC sinus med she's been taking  Focus on bland diet with toast, banana, crackers, and/or chicken noodle soup  Emphasized the importance of rehydrating and maintaining good hydration on a daily basis  Encouraged to get plenty of sleep over the next week to allow body to fully recover  Strive to reduce stressors in her life as these will only continue to worsen her overall health  Monitor blood pressure daily and keep record to bring to next appointment  Continue chronic medications as prescribed  Recommend waiting until October to receive annual flu vaccine to ensure optimal protection during the entire flu season - needs to wait at least 2 weeks after she starts to feel normal again  Needs to get fasting blood work done  Resume consistent use of Metformin ER with largest meal of the day  Counseled that Metformin takes 4-6 weeks to get to therapeutic levels in her body so it's important that she remain consistent with this med  Recommend getting newest COVID vaccine given formulation that should better protect against the most recently circulating Omicron variant

## 2022-09-28 ENCOUNTER — HOSPITAL ENCOUNTER (OUTPATIENT)
Dept: LAB | Age: 37
Discharge: HOME | End: 2022-10-01

## 2022-09-28 DIAGNOSIS — I10 PRIMARY HYPERTENSION: ICD-10-CM

## 2022-09-28 DIAGNOSIS — E55.9 VITAMIN D DEFICIENCY: ICD-10-CM

## 2022-09-28 DIAGNOSIS — E11.9 TYPE 2 DIABETES MELLITUS WITHOUT COMPLICATION, WITHOUT LONG-TERM CURRENT USE OF INSULIN (HCC): ICD-10-CM

## 2022-09-28 DIAGNOSIS — E53.8 LOW VITAMIN B12 LEVEL: ICD-10-CM

## 2022-09-28 LAB
25(OH)D3 SERPL-MCNC: 24.1 NG/ML (ref 30–100)
ALBUMIN SERPL-MCNC: 4 G/DL (ref 3.5–5)
ALBUMIN/GLOB SERPL: 1.2 {RATIO} (ref 1.2–3.5)
ALP SERPL-CCNC: 48 U/L (ref 50–136)
ALT SERPL-CCNC: 30 U/L (ref 12–65)
ANION GAP SERPL CALC-SCNC: 5 MMOL/L (ref 4–13)
AST SERPL-CCNC: 21 U/L (ref 15–37)
BASOPHILS # BLD: 0 K/UL (ref 0–0.2)
BASOPHILS NFR BLD: 1 % (ref 0–2)
BILIRUB SERPL-MCNC: 0.7 MG/DL (ref 0.2–1.1)
BUN SERPL-MCNC: 13 MG/DL (ref 6–23)
CALCIUM SERPL-MCNC: 9.4 MG/DL (ref 8.3–10.4)
CHLORIDE SERPL-SCNC: 106 MMOL/L (ref 101–110)
CO2 SERPL-SCNC: 28 MMOL/L (ref 21–32)
CREAT SERPL-MCNC: 0.9 MG/DL (ref 0.6–1)
DIFFERENTIAL METHOD BLD: ABNORMAL
EOSINOPHIL # BLD: 0.2 K/UL (ref 0–0.8)
EOSINOPHIL NFR BLD: 3 % (ref 0.5–7.8)
ERYTHROCYTE [DISTWIDTH] IN BLOOD BY AUTOMATED COUNT: 14.8 % (ref 11.9–14.6)
EST. AVERAGE GLUCOSE BLD GHB EST-MCNC: 151 MG/DL
GLOBULIN SER CALC-MCNC: 3.3 G/DL (ref 2.3–3.5)
GLUCOSE SERPL-MCNC: 112 MG/DL (ref 65–100)
HBA1C MFR BLD: 6.9 % (ref 4.8–5.6)
HCT VFR BLD AUTO: 40.3 % (ref 35.8–46.3)
HGB BLD-MCNC: 12.2 G/DL (ref 11.7–15.4)
IMM GRANULOCYTES # BLD AUTO: 0 K/UL (ref 0–0.5)
IMM GRANULOCYTES NFR BLD AUTO: 0 % (ref 0–5)
LYMPHOCYTES # BLD: 2.1 K/UL (ref 0.5–4.6)
LYMPHOCYTES NFR BLD: 34 % (ref 13–44)
MCH RBC QN AUTO: 24.9 PG (ref 26.1–32.9)
MCHC RBC AUTO-ENTMCNC: 30.3 G/DL (ref 31.4–35)
MCV RBC AUTO: 82.2 FL (ref 79.6–97.8)
MONOCYTES # BLD: 0.5 K/UL (ref 0.1–1.3)
MONOCYTES NFR BLD: 8 % (ref 4–12)
NEUTS SEG # BLD: 3.4 K/UL (ref 1.7–8.2)
NEUTS SEG NFR BLD: 54 % (ref 43–78)
NRBC # BLD: 0 K/UL (ref 0–0.2)
PLATELET # BLD AUTO: 247 K/UL (ref 150–450)
PMV BLD AUTO: 11.3 FL (ref 9.4–12.3)
POTASSIUM SERPL-SCNC: 4.3 MMOL/L (ref 3.5–5.1)
PROT SERPL-MCNC: 7.3 G/DL (ref 6.3–8.2)
RBC # BLD AUTO: 4.9 M/UL (ref 4.05–5.2)
SODIUM SERPL-SCNC: 139 MMOL/L (ref 136–145)
VIT B12 SERPL-MCNC: 2137 PG/ML (ref 193–986)
WBC # BLD AUTO: 6.2 K/UL (ref 4.3–11.1)

## 2022-09-30 NOTE — RESULT ENCOUNTER NOTE
Vitamin d levels have improved but not yet to goal of > 30. We'll give it a bit more time before determining if higher dose is needed - for now just remain consistent with 5000 iu daily. Vitamin b12 levels are doing well so maintain same dosage of replacement on this. Fasting blood sugar is higher than 3 months ago with overall average (A1c) slightly higher as well - it is important that she get back in Metformin and take consistently every day with largest meal until next visit. Schedule next office visit with me in 3 months and fasting labs 3-5 days prior.

## 2022-10-21 DIAGNOSIS — I10 UNCONTROLLED HYPERTENSION: ICD-10-CM

## 2022-10-21 NOTE — TELEPHONE ENCOUNTER
Pt requesting refill on Amlodipine 10 mg, to be sent to [facility] on [address]. She have enough till Tuesday.  Please advise    Thank you    Theo Bella

## 2022-10-24 NOTE — TELEPHONE ENCOUNTER
Please contact pharmacy. Medication records show that a 90 day supply was sent in on 6/14/22 with 3 refills so there should be plenty of refills available. . ?

## 2022-10-25 RX ORDER — AMLODIPINE BESYLATE 10 MG/1
10 TABLET ORAL DAILY
Qty: 90 TABLET | Refills: 2 | Status: SHIPPED | OUTPATIENT
Start: 2022-10-25

## 2022-10-25 NOTE — TELEPHONE ENCOUNTER
Pharm states  that original rx was canceled so there are no refills at this time. See below. The original prescription was discontinued on 7/26/2022 by Candelaria Schrader MD for the following reason: ERROR. Renewing this prescription may not be appropriate.

## 2022-10-28 DIAGNOSIS — Z30.41 ENCOUNTER FOR BIRTH CONTROL PILLS MAINTENANCE: ICD-10-CM

## 2022-10-29 DIAGNOSIS — Z30.41 ENCOUNTER FOR BIRTH CONTROL PILLS MAINTENANCE: ICD-10-CM

## 2022-10-31 RX ORDER — ACETAMINOPHEN AND CODEINE PHOSPHATE 120; 12 MG/5ML; MG/5ML
1 SOLUTION ORAL DAILY
Qty: 90 TABLET | Refills: 0 | OUTPATIENT
Start: 2022-10-31

## 2022-10-31 RX ORDER — ACETAMINOPHEN AND CODEINE PHOSPHATE 120; 12 MG/5ML; MG/5ML
SOLUTION ORAL
Qty: 84 TABLET | OUTPATIENT
Start: 2022-10-31

## 2022-10-31 NOTE — TELEPHONE ENCOUNTER
Birth control pills need to be prescribed by her gynecologist moving forward. We provided a temporary supply until she could get established with one. It appears she saw Dr. Sanna Blanca so she just needs to call his office and request refills.

## 2022-11-10 RX ORDER — ACETAMINOPHEN AND CODEINE PHOSPHATE 120; 12 MG/5ML; MG/5ML
1 SOLUTION ORAL DAILY
Qty: 84 TABLET | Refills: 2 | Status: SHIPPED | OUTPATIENT
Start: 2022-11-10

## 2022-11-21 NOTE — PROGRESS NOTES
[address] [facility]. [address], [address] [facility]  [phone]    Patient Name:  Louise Harrison  YOB: 1985      Office Visit 11/28/2022    CHIEF COMPLAINT:    Chief Complaint   Patient presents with    CPAP/BiPAP    Sleep Apnea         HISTORY OF PRESENT ILLNESS:      The patient is seen today in follow-up of very severe obstructive sleep apnea. She has a history of sleep apnea with an AHI of 141.5 and desaturations as low as 71%. This was in the absence of REM sleep suggesting that had REM been present the results may have been much worse. She did undergo a CPAP to BiPAP titration which revealed the optimal level of BiPAP to be 17/9 cmH2O. She did go into REM sleep during the BiPAP titration. At the time of her last visit her BMI was 57.2. Download today indicates 98% compliance over the past 125 days. Average usage is running 8.5 hours per night and her AHI is down to 3.3. The maximum leak noted on her machine is 13.9 L/min. At this point no adjustment in her BiPAP setting is needed nor does she need a different mask. The patient also remains significantly overweight. Her BMI today is calculated at 58.74 which is up from her last visit but her weight is actually down about 13 pounds over the past couple of months. She does report that she has pretty much stopped drinking sodas but is still drinking sweet tea. She is counseled to try to avoid this if at all possible. She still has the Wheat Belly diet handout that was provided at her last visit. She has not really been following the recommendations on her food choices but this would be important to start to facilitate more weight loss. The patient's systolic blood pressure today is elevated. She does have a blood pressure cuff at home but does not routinely check her blood pressure.   I encouraged her to start doing this so that if she needs an adjustment in her medications, her PCP can better decide on therapy. The patient's [place] score today is 1/24 consistent with no hypersomnia. She did have a recent CBC in September revealing no evidence of polycythemia to suggest significant nocturnal hypoxemia. Her red blood cell indices were low suggesting the possibility of iron deficiency.       EPWORTH SCALE:    Sleep Medicine 2022   Sitting and reading 0   Watching TV 0   Sitting, inactive in a public place (e.g. a theatre or a meeting) 0   As a passenger in a car for an hour without a break 0   Lying down to rest in the afternoon when circumstances permit 1   Sitting and talking to someone 0   Sitting quietly after a lunch without alcohol 0   In a car, while stopped for a few minutes in traffic 0   [place] Sleepiness Score 1         Component Ref Range & Units 22 0807 22 1347   WBC 4.3 - 11.1 K/uL 6.2  6.4    RBC 4.05 - 5.2 M/uL 4.90  5.27 High     Hemoglobin 11.7 - 15.4 g/dL 12.2  13.0    Hematocrit 35.8 - 46.3 % 40.3  43.5    MCV 79.6 - 97.8 FL 82.2  82.5    MCH 26.1 - 32.9 PG 24.9 Low   24.7 Low     MCHC 31.4 - 35.0 g/dL 30.3 Low   29.9 Low     RDW 11.9 - 14.6 % 14.8 High   15.1 High     Platelets 357 - 617 K/uL 247  303          Past Medical History:   Diagnosis Date    GERD (gastroesophageal reflux disease)     Hypertension     Perennial allergic rhinitis with seasonal variation          [unfilled]      Past Surgical History:   Procedure Laterality Date     SECTION      OTHER SURGICAL HISTORY  2007    Staph infection due to C section incision opening; but on wound vac       [unfilled]    Social History     Socioeconomic History    Marital status: Single     Spouse name: Not on file    Number of children: Not on file    Years of education: Not on file    Highest education level: Not on file   Occupational History    Not on file   Tobacco Use    Smoking status: Never    Smokeless tobacco: Never   Vaping Use    Vaping Use: Never used   Substance and Sexual Activity    Alcohol use: Not Currently    Drug use: Not Currently     Types: Marijuana Juliana Tahir), Methamphetamines (Crystal Meth)     Comment: 2004     Sexual activity: Yes     Partners: Male     Birth control/protection: Pill   Other Topics Concern    Not on file   Social History Narrative    Feels safe at home. Social Determinants of Health     Financial Resource Strain: Not on file   Food Insecurity: Not on file   Transportation Needs: Not on file   Physical Activity: Not on file   Stress: Not on file   Social Connections: Not on file   Intimate Partner Violence: Not on file   Housing Stability: Not on file         Family History   Problem Relation Age of Onset    Hypertension Mother     Thyroid Disease Mother         Hypothyroidism    Hypertension Father     Coronary Art Dis Father 62    Hypertension Maternal Grandmother     Thyroid Disease Maternal Grandmother         Hypothyroidism    Hypertension Maternal Grandfather     Hypertension Paternal Aunt     Hypertension Paternal Uncle          No Known Allergies      Current Outpatient Medications   Medication Sig    norethindrone (MICRONOR) 0.35 MG tablet Take 1 tablet by mouth daily    amLODIPine (NORVASC) 10 MG tablet Take 1 tablet by mouth daily    valsartan (DIOVAN) 320 MG tablet Take 1 tablet by mouth daily    naproxen (NAPROSYN) 500 MG tablet Take 1 tablet by mouth twice daily with food    metFORMIN (GLUCOPHAGE XR) 500 MG extended release tablet Take 1 tablet by mouth daily (with breakfast)    Cholecalciferol (VITAMIN D3) 1.25 MG (96865 UT) CAPS Take by mouth    vitamin B-12 (CYANOCOBALAMIN) 100 MCG tablet Take 50 mcg by mouth daily    Cetirizine HCl (ZYRTEC ALLERGY) 10 MG CAPS Take 1 capsule by mouth daily    omeprazole (PRILOSEC) 20 MG delayed release capsule Take 20 mg by mouth daily     No current facility-administered medications for this visit.            REVIEW OF SYSTEMS:   CONSTITUTIONAL:   There is no history of fever, chills, night sweats, or weight gain. She has lost about 13 pounds over the past 2 months. EYES:   Denies problems with eye pain, erythema, blurred vision, or visual field loss. ENTM:   Denies history of tinnitus, epistaxis, sore throat, hoarseness, or dysphonia. LYMPH:   Denies swollen glands. CARDIAC:   No chest pain, pressure, discomfort, palpitations, orthopnea, murmurs; she has had intermittent lower extremity edema. MS:   No history of myalgias, arthralgias, bone pain, or muscle cramps. SKIN:   No history of rashes, jaundice, cyanosis, nodules, or ulcers. NEURO:   There is no history of AMS, persistent headache, decreased level of consciousness, seizures, or motor or sensory deficits. PHYSICAL EXAM:    Vitals:    11/28/22 1341   BP: (!) 160/78   Pulse: (!) 103   Resp: 16   Temp: 97.5 °F (36.4 °C)   SpO2: 98%          GENERAL APPEARANCE:   The patient is morbidly obese and in no respiratory distress. HEENT:   PERRL. Conjunctivae unremarkable. Nasal mucosa is without epistaxis, exudate, or polyps. Gums and dentition are unremarkable. There is severe oropharyngeal narrowing. NECK/LYMPHATIC:   Symmetrical with no elevation of jugular venous pulsation. Trachea midline. No thyroid enlargement. No cervical adenopathy. LUNGS:   Normal respiratory effort with symmetrical lung expansion. Breath sounds are mildly decreased bilaterally but clear. HEART:   There is a regular rate and rhythm. No murmur, rub, or gallop. There is trace to 1+ edema in the lower extremities. ABDOMEN:   Soft and non-tender. Bowel sounds are normal.     SKIN:   There are no rashes, cyanosis, jaundice, or ecchymosis present. EXTREMITIES:   The extremities are unremarkable without clubbing, cyanosis, joint inflammation, degenerative, or ischemic change. MUSCULOSKELETAL:   There is no abnormal tone, muscle atrophy, or abnormal movement present. NEURO:   The patient is alert and oriented to person, place, and time.   Memory appears intact and mood is normal.  No gross sensorimotor deficits are present. ASSESSMENT:  (Medical Decision Making)      ICD-10-CM    1. SARBJIT (obstructive sleep apnea)  G47.33 This is well controlled on BiPAP therapy. No adjustment is needed. 2. Morbid obesity with BMI of 50.0-59.9, adult ([facility] [address]  E66.01 This is improved with about 13 pound weight loss in the past couple of months. Ongoing efforts to lose weight through caloric restriction particular of simple carbohydrates as well as increase physical activity are needed. Z68.43       3. Hypersomnia  G47.10 This is now resolved and her Posen score is down to 1. She feels remarkably better. PLAN:    Continue BiPAP at 17/9 cmH2O with an EPR of 1. Continue efforts at weight loss as above. The patient is recommended to check her blood pressures in the morning, afternoon, and evening 1 or 2 days a week until she follows up with her PCP to see if additional blood pressure medicine is needed. Follow-up here will be in 6 months. No orders of the defined types were placed in this encounter. No orders of the defined types were placed in this encounter. Ofelia Delgado MD  Electronically signed    Over 50% of today's office visit was spent in face to face time reviewing test results, prognosis, importance of compliance, education about disease process, benefits of medications, instructions for management of acute flare-ups, and follow up plans. Total face to face time spent with the patient and charting was 20 minutes. Dictated using voice recognition software.   Proof read but unrecognized errors may exist.

## 2022-11-28 ENCOUNTER — OFFICE VISIT (OUTPATIENT)
Dept: SLEEP MEDICINE | Age: 37
End: 2022-11-28
Payer: COMMERCIAL

## 2022-11-28 VITALS
BODY MASS INDEX: 48.82 KG/M2 | RESPIRATION RATE: 16 BRPM | DIASTOLIC BLOOD PRESSURE: 78 MMHG | WEIGHT: 293 LBS | HEIGHT: 65 IN | SYSTOLIC BLOOD PRESSURE: 160 MMHG | TEMPERATURE: 97.5 F | OXYGEN SATURATION: 98 % | HEART RATE: 103 BPM

## 2022-11-28 DIAGNOSIS — G47.33 OSA (OBSTRUCTIVE SLEEP APNEA): Primary | ICD-10-CM

## 2022-11-28 DIAGNOSIS — E66.01 MORBID OBESITY WITH BMI OF 50.0-59.9, ADULT (HCC): ICD-10-CM

## 2022-11-28 DIAGNOSIS — G47.10 HYPERSOMNIA: ICD-10-CM

## 2022-11-28 PROCEDURE — 99214 OFFICE O/P EST MOD 30 MIN: CPT | Performed by: INTERNAL MEDICINE

## 2022-11-28 PROCEDURE — 3075F SYST BP GE 130 - 139MM HG: CPT | Performed by: INTERNAL MEDICINE

## 2022-11-28 PROCEDURE — 3078F DIAST BP <80 MM HG: CPT | Performed by: INTERNAL MEDICINE

## 2022-11-28 ASSESSMENT — SLEEP AND FATIGUE QUESTIONNAIRES
ESS TOTAL SCORE: 1
HOW LIKELY ARE YOU TO NOD OFF OR FALL ASLEEP IN A CAR, WHILE STOPPED FOR A FEW MINUTES IN TRAFFIC: 0
HOW LIKELY ARE YOU TO NOD OFF OR FALL ASLEEP WHILE LYING DOWN TO REST IN THE AFTERNOON WHEN CIRCUMSTANCES PERMIT: 1
HOW LIKELY ARE YOU TO NOD OFF OR FALL ASLEEP WHILE WATCHING TV: 0
HOW LIKELY ARE YOU TO NOD OFF OR FALL ASLEEP WHEN YOU ARE A PASSENGER IN A CAR FOR AN HOUR WITHOUT A BREAK: 0
HOW LIKELY ARE YOU TO NOD OFF OR FALL ASLEEP WHILE SITTING AND TALKING TO SOMEONE: 0
HOW LIKELY ARE YOU TO NOD OFF OR FALL ASLEEP WHILE SITTING QUIETLY AFTER LUNCH WITHOUT ALCOHOL: 0
HOW LIKELY ARE YOU TO NOD OFF OR FALL ASLEEP WHILE SITTING AND READING: 0
HOW LIKELY ARE YOU TO NOD OFF OR FALL ASLEEP WHILE SITTING INACTIVE IN A PUBLIC PLACE: 0

## 2023-01-04 ENCOUNTER — PATIENT MESSAGE (OUTPATIENT)
Dept: INTERNAL MEDICINE CLINIC | Facility: CLINIC | Age: 38
End: 2023-01-04

## 2023-01-04 DIAGNOSIS — E78.5 HYPERLIPIDEMIA LDL GOAL <100: ICD-10-CM

## 2023-01-04 DIAGNOSIS — E11.9 TYPE 2 DIABETES MELLITUS WITHOUT COMPLICATION, WITHOUT LONG-TERM CURRENT USE OF INSULIN (HCC): ICD-10-CM

## 2023-01-04 DIAGNOSIS — E55.9 VITAMIN D DEFICIENCY: Primary | ICD-10-CM

## 2023-01-04 NOTE — TELEPHONE ENCOUNTER
From: Osorio Blake  To: Valdo rUena  Sent: 1/4/2023 9:34 AM EST  Subject: xray appt    I still haven't gone to get my xray for my lower back/hip. I have been having trouble with my knee popping and then barely being able to put weight on it and walk. so I was wondering if you could put in an order for me to have that xrayed as well and I can do both soon. it's my left knee. thanks.

## 2023-01-19 ENCOUNTER — TELEPHONE (OUTPATIENT)
Dept: INTERNAL MEDICINE CLINIC | Facility: CLINIC | Age: 38
End: 2023-01-19

## 2023-01-19 NOTE — TELEPHONE ENCOUNTER
----- Message from [phone] sent at 1/19/2023 11:10 AM EST -----  Subject: Message to Provider    QUESTIONS  Information for Provider? The patient is scheduled for her lab work on   1/31/23 and she has an appt on 2/14/23 because she was not able to keep   her original office appt on 2/7/23. She is asking if the new appt is an   issue or will cause issues with her lab visit being 2 weeks apart? Please   advise the patient.  ---------------------------------------------------------------------------  --------------  Rajendra GUY  [phone]; OK to leave message on voicemail  ---------------------------------------------------------------------------  --------------  SCRIPT ANSWERS  Relationship to Patient?  Self

## 2023-02-06 DIAGNOSIS — E11.9 TYPE 2 DIABETES MELLITUS WITHOUT COMPLICATION, WITHOUT LONG-TERM CURRENT USE OF INSULIN (HCC): ICD-10-CM

## 2023-02-06 DIAGNOSIS — E78.5 HYPERLIPIDEMIA LDL GOAL <100: ICD-10-CM

## 2023-02-06 DIAGNOSIS — E55.9 VITAMIN D DEFICIENCY: ICD-10-CM

## 2023-02-07 LAB
25(OH)D3 SERPL-MCNC: 31.5 NG/ML (ref 30–100)
ALBUMIN SERPL-MCNC: 3.7 G/DL (ref 3.5–5)
ALBUMIN/GLOB SERPL: 1.1 (ref 0.4–1.6)
ALP SERPL-CCNC: 49 U/L (ref 50–136)
ALT SERPL-CCNC: 36 U/L (ref 12–65)
ANION GAP SERPL CALC-SCNC: 7 MMOL/L (ref 2–11)
AST SERPL-CCNC: 18 U/L (ref 15–37)
BILIRUB SERPL-MCNC: 0.5 MG/DL (ref 0.2–1.1)
BUN SERPL-MCNC: 9 MG/DL (ref 6–23)
CALCIUM SERPL-MCNC: 8.6 MG/DL (ref 8.3–10.4)
CHLORIDE SERPL-SCNC: 108 MMOL/L (ref 101–110)
CHOLEST SERPL-MCNC: 146 MG/DL
CO2 SERPL-SCNC: 23 MMOL/L (ref 21–32)
CREAT SERPL-MCNC: 0.9 MG/DL (ref 0.6–1)
EST. AVERAGE GLUCOSE BLD GHB EST-MCNC: 131 MG/DL
GLOBULIN SER CALC-MCNC: 3.4 G/DL (ref 2.8–4.5)
GLUCOSE SERPL-MCNC: 108 MG/DL (ref 65–100)
HBA1C MFR BLD: 6.2 % (ref 4.8–5.6)
HDLC SERPL-MCNC: 32 MG/DL (ref 40–60)
HDLC SERPL: 4.6
LDLC SERPL CALC-MCNC: 92 MG/DL
POTASSIUM SERPL-SCNC: 4.1 MMOL/L (ref 3.5–5.1)
PROT SERPL-MCNC: 7.1 G/DL (ref 6.3–8.2)
SODIUM SERPL-SCNC: 138 MMOL/L (ref 133–143)
TRIGL SERPL-MCNC: 110 MG/DL (ref 35–150)
VLDLC SERPL CALC-MCNC: 22 MG/DL (ref 6–23)

## 2023-02-11 SDOH — ECONOMIC STABILITY: FOOD INSECURITY: WITHIN THE PAST 12 MONTHS, THE FOOD YOU BOUGHT JUST DIDN'T LAST AND YOU DIDN'T HAVE MONEY TO GET MORE.: PATIENT DECLINED

## 2023-02-11 SDOH — ECONOMIC STABILITY: INCOME INSECURITY: HOW HARD IS IT FOR YOU TO PAY FOR THE VERY BASICS LIKE FOOD, HOUSING, MEDICAL CARE, AND HEATING?: PATIENT DECLINED

## 2023-02-11 SDOH — ECONOMIC STABILITY: FOOD INSECURITY: WITHIN THE PAST 12 MONTHS, YOU WORRIED THAT YOUR FOOD WOULD RUN OUT BEFORE YOU GOT MONEY TO BUY MORE.: PATIENT DECLINED

## 2023-02-11 SDOH — ECONOMIC STABILITY: TRANSPORTATION INSECURITY
IN THE PAST 12 MONTHS, HAS LACK OF TRANSPORTATION KEPT YOU FROM MEETINGS, WORK, OR FROM GETTING THINGS NEEDED FOR DAILY LIVING?: PATIENT DECLINED

## 2023-02-11 SDOH — ECONOMIC STABILITY: HOUSING INSECURITY
IN THE LAST 12 MONTHS, WAS THERE A TIME WHEN YOU DID NOT HAVE A STEADY PLACE TO SLEEP OR SLEPT IN A SHELTER (INCLUDING NOW)?: PATIENT REFUSED

## 2023-02-14 ENCOUNTER — OFFICE VISIT (OUTPATIENT)
Dept: INTERNAL MEDICINE CLINIC | Facility: CLINIC | Age: 38
End: 2023-02-14
Payer: COMMERCIAL

## 2023-02-14 VITALS
OXYGEN SATURATION: 98 % | WEIGHT: 293 LBS | SYSTOLIC BLOOD PRESSURE: 152 MMHG | DIASTOLIC BLOOD PRESSURE: 92 MMHG | BODY MASS INDEX: 48.82 KG/M2 | TEMPERATURE: 98.2 F | HEIGHT: 65 IN | HEART RATE: 69 BPM

## 2023-02-14 DIAGNOSIS — F43.23 SITUATIONAL MIXED ANXIETY AND DEPRESSIVE DISORDER: ICD-10-CM

## 2023-02-14 DIAGNOSIS — I10 ELEVATED BLOOD PRESSURE READING IN OFFICE WITH DIAGNOSIS OF HYPERTENSION: ICD-10-CM

## 2023-02-14 DIAGNOSIS — E11.9 TYPE 2 DIABETES MELLITUS WITHOUT COMPLICATION, WITHOUT LONG-TERM CURRENT USE OF INSULIN (HCC): Primary | ICD-10-CM

## 2023-02-14 DIAGNOSIS — E66.01 MORBID OBESITY WITH BMI OF 50.0-59.9, ADULT (HCC): ICD-10-CM

## 2023-02-14 DIAGNOSIS — E55.9 VITAMIN D DEFICIENCY: ICD-10-CM

## 2023-02-14 PROCEDURE — 99214 OFFICE O/P EST MOD 30 MIN: CPT | Performed by: PHYSICIAN ASSISTANT

## 2023-02-14 PROCEDURE — 3075F SYST BP GE 130 - 139MM HG: CPT | Performed by: PHYSICIAN ASSISTANT

## 2023-02-14 PROCEDURE — 3080F DIAST BP >= 90 MM HG: CPT | Performed by: PHYSICIAN ASSISTANT

## 2023-02-14 PROCEDURE — 3044F HG A1C LEVEL LT 7.0%: CPT | Performed by: PHYSICIAN ASSISTANT

## 2023-02-14 RX ORDER — METFORMIN HYDROCHLORIDE 500 MG/1
500 TABLET, EXTENDED RELEASE ORAL
Qty: 90 TABLET | Refills: 3 | Status: SHIPPED | OUTPATIENT
Start: 2023-02-14

## 2023-02-14 ASSESSMENT — PATIENT HEALTH QUESTIONNAIRE - PHQ9
SUM OF ALL RESPONSES TO PHQ QUESTIONS 1-9: 7
5. POOR APPETITE OR OVEREATING: 1
1. LITTLE INTEREST OR PLEASURE IN DOING THINGS: 2
SUM OF ALL RESPONSES TO PHQ9 QUESTIONS 1 & 2: 3
SUM OF ALL RESPONSES TO PHQ QUESTIONS 1-9: 7
3. TROUBLE FALLING OR STAYING ASLEEP: 1
7. TROUBLE CONCENTRATING ON THINGS, SUCH AS READING THE NEWSPAPER OR WATCHING TELEVISION: 1
SUM OF ALL RESPONSES TO PHQ QUESTIONS 1-9: 7
6. FEELING BAD ABOUT YOURSELF - OR THAT YOU ARE A FAILURE OR HAVE LET YOURSELF OR YOUR FAMILY DOWN: 1
9. THOUGHTS THAT YOU WOULD BE BETTER OFF DEAD, OR OF HURTING YOURSELF: 0
8. MOVING OR SPEAKING SO SLOWLY THAT OTHER PEOPLE COULD HAVE NOTICED. OR THE OPPOSITE, BEING SO FIGETY OR RESTLESS THAT YOU HAVE BEEN MOVING AROUND A LOT MORE THAN USUAL: 0
4. FEELING TIRED OR HAVING LITTLE ENERGY: 0
SUM OF ALL RESPONSES TO PHQ QUESTIONS 1-9: 7
2. FEELING DOWN, DEPRESSED OR HOPELESS: 1

## 2023-02-14 ASSESSMENT — ANXIETY QUESTIONNAIRES
3. WORRYING TOO MUCH ABOUT DIFFERENT THINGS: 1
1. FEELING NERVOUS, ANXIOUS, OR ON EDGE: 1
6. BECOMING EASILY ANNOYED OR IRRITABLE: 1
2. NOT BEING ABLE TO STOP OR CONTROL WORRYING: 1
GAD7 TOTAL SCORE: 7
4. TROUBLE RELAXING: 1
7. FEELING AFRAID AS IF SOMETHING AWFUL MIGHT HAPPEN: 0
5. BEING SO RESTLESS THAT IT IS HARD TO SIT STILL: 2

## 2023-02-14 ASSESSMENT — ENCOUNTER SYMPTOMS
BACK PAIN: 0
DIARRHEA: 0
CONSTIPATION: 0
SHORTNESS OF BREATH: 0

## 2023-02-14 NOTE — PROGRESS NOTES
Jono Sloan (:  1985) is a 40 y.o. female,Established patient, here for evaluation of the following chief complaint(s):  Follow-up (Diabetes, Hypertension, Obesity, Vit D Def)         ASSESSMENT/PLAN:  1. Type 2 diabetes mellitus without complication, without long-term current use of insulin (HCC)  -     metFORMIN (GLUCOPHAGE XR) 500 MG extended release tablet; Take 1 tablet by mouth daily (with breakfast), Disp-90 tablet, R-3Normal  -     HM DIABETES FOOT EXAM  -     Comprehensive Metabolic Panel; Future  -     Hemoglobin A1C; Future  -     Lipid Panel; Future  -     Microalbumin / Creatinine Urine Ratio; Future  2. Elevated blood pressure reading in office with diagnosis of hypertension  -     CBC with Auto Differential; Future  -     Comprehensive Metabolic Panel; Future  3. Vitamin D deficiency  -     Vitamin D 25 Hydroxy; Future  4. Morbid obesity with BMI of 50.0-59.9, adult ([facility] [address]  5. Situational mixed anxiety and depressive disorder      Patient Instructions   Monitor blood pressure 2-3 times/week and keep record to bring to next appointment - advised to notify me if it regularly stays > 140/90  Encouraged to use recent weight loss success to motivate change on her side with healthier diet choices and making time for an exercise routine  Recommend stationary bicycle and/or water exercise to avoid further strain on back and/or knee  Continue chronic medications as prescribed  Advised of the importance to get an annual diabetic eye exam done - offered referral but patient says she will schedule  Discussed the eligibility and benefits to keeping vaccines updated due to compromised immune system that diabetes does cause      Return in about 5 months (around 2023) for diabetes f/u. Subjective   SUBJECTIVE/OBJECTIVE:  The patient is a 40 y.o. female who is seen for follow up of diabetes. Current monitoring regimen: patient does not check sugars.   Glucose monitoring frequency: 0 times daily  Home blood sugar records: patient does not test  Any episodes of hypoglycemia? no  Known diabetic complications: none  Current diabetic medications include: oral agent (monotherapy): Glucophage  mg once daily with meal.       Current Eye Exam (within one year): No  Current Urine Microalbumin: No  Is She on ACE inhibitor or angiotensin II receptor blocker? Yes - valsartan (Diovan)  Current Foot Exam (within one year): No      Weight trend: decreasing recently  Prior visit with dietician: no  Current diet: meals per day on average: 2; restricting intake of the following: sugar  Current exercise: no regular exercise        Last HbA1C:    Lab Results   Component Value Date    LABA1C 6.2 (H) 02/06/2023     Lab Results   Component Value Date     02/06/2023           Last Lipid Panel:   Lab Results   Component Value Date    CHOL 146 02/06/2023    CHOL 163 06/27/2022     Lab Results   Component Value Date    TRIG 110 02/06/2023    TRIG 124 06/27/2022     Lab Results   Component Value Date    HDL 32 (L) 02/06/2023    HDL 32 (L) 06/27/2022     Lab Results   Component Value Date    LDLCALC 92 02/06/2023    LDLCALC 106.2 (H) 06/27/2022     Lab Results   Component Value Date    LABVLDL 22 02/06/2023    LABVLDL 24.8 (H) 06/27/2022     Lab Results   Component Value Date    CHOLHDLRATIO 4.6 02/06/2023    CHOLHDLRATIO 5.1 06/27/2022       The patient is a 40 y.o. female who is seen for follow-up of hypertension. She is not exercising and is not adherent to low salt diet. Daily caffiene intake: a known amount (none). Current medication regimen consists of: Diovan 320 mg daily + Amlodipine 10 mg daily. Blood pressure is not measured at home. BP Readings from Last 3 Encounters:   02/14/23 (!) 130/90   11/28/22 (!) 160/78   09/27/22 (!) 150/84       Patient is here for follow-up of vitamin d deficiency.   The current state of this condition is improved, no significant medication side effects noted, and reasonably well controlled on OTC vitamin d3 5000 iu daily - taking as prescribed. Vit D, 25-Hydroxy   Date Value Ref Range Status   02/06/2023 31.5 30.0 - 100.0 ng/mL Final   09/28/2022 24.1 (L) 30.0 - 100.0 ng/mL Final   06/27/2022 16.5 (L) 30.0 - 100.0 ng/mL Final         Review of Systems   Constitutional:  Positive for unexpected weight change. Negative for fatigue. Respiratory:  Negative for shortness of breath. Cardiovascular:  Negative for chest pain, palpitations and leg swelling. Gastrointestinal:  Negative for constipation and diarrhea. Endocrine: Negative for cold intolerance and heat intolerance. Negative for hair loss. Musculoskeletal:  Positive for arthralgias (L knee). Negative for back pain (improved since taking collagen supplements). Neurological:  Negative for dizziness and headaches. Psychiatric/Behavioral:  Positive for dysphoric mood (situational). The patient is nervous/anxious (situational). BP (!) 130/90 (Site: Left Upper Arm, Position: Sitting, Cuff Size: Large Adult)   Pulse 69   Temp 98.2 °F (36.8 °C) (Temporal)   Ht 5' 5\" (1.651 m)   Wt (!) 329 lb (149.2 kg)   SpO2 98%   BMI 54.75 kg/m²       BP (!) 152/92   Pulse 69   Temp 98.2 °F (36.8 °C) (Temporal)   Ht 5' 5\" (1.651 m)   Wt (!) 329 lb (149.2 kg)   SpO2 98%   BMI 54.75 kg/m²       Objective   Physical Exam  Constitutional:       Appearance: Normal appearance. She is obese. HENT:      Head: Normocephalic and atraumatic. Eyes:      Conjunctiva/sclera: Conjunctivae normal.      Pupils: Pupils are equal, round, and reactive to light. Neck:      Vascular: No carotid bruit. Cardiovascular:      Rate and Rhythm: Normal rate and regular rhythm. Heart sounds: Normal heart sounds. Pulmonary:      Effort: Pulmonary effort is normal.      Breath sounds: Normal breath sounds. Musculoskeletal:         General: Normal range of motion. Cervical back: Normal range of motion. Skin:     General: Skin is warm and dry. Neurological:      Mental Status: She is alert and oriented to person, place, and time. Comments: Visual inspection:  Deformity/amputation: absent  Skin lesions/pre-ulcerative calluses: absent  Edema: right- trace, left- trace    Sensory exam:  Monofilament sensation: normal  (minimum of 5 random plantar locations tested, avoiding callused areas - > 1 area with absence of sensation is + for neuropathy)    Plus at least one of the following:  Pulses: Normal  Pinprick: Intact  Proprioception: Intact  Vibration (128 Hz): Not Performed   Psychiatric:         Mood and Affect: Mood normal.         Behavior: Behavior normal.         Thought Content: Thought content normal.         Judgment: Judgment normal.        PHQ-9 Questionaire 2/14/2023 9/27/2022 8/16/2022 7/26/2022 6/14/2022   Little interest or pleasure in doing things 2 0 0 0 0   Feeling down, depressed, or hopeless 1 0 0 0 0   Trouble falling or staying asleep, or sleeping too much 1 - - - -   Feeling tired or having little energy 0 - - - -   Poor appetite or overeating 1 - - - -   Feeling bad about yourself - or that you are a failure or have let yourself or your family down 1 - - - -   Trouble concentrating on things, such as reading the newspaper or watching television 1 - - - -   Moving or speaking so slowly that other people could have noticed.  Or the opposite - being so fidgety or restless that you have been moving around a lot more than usual 0 - - - -   Thoughts that you would be better off dead, or of hurting yourself in some way 0 - - - -   PHQ-9 Total Score 7 0 0 0 0       CHARLES-7 SCREENING 2/14/2023   Feeling nervous, anxious, or on edge Several days   Not being able to stop or control worrying Several days   Worrying too much about different things Several days   Trouble relaxing Several days   Being so restless that it is hard to sit still More than half the days   Becoming easily annoyed or irritable Several days   Feeling afraid as if something awful might happen Not at all   CHARLES-7 Total Score 7       On this date 2/14/2023 I have spent 35 minutes reviewing previous notes, test results and face to face with the patient discussing the diagnosis and importance of compliance with the treatment plan as well as documenting on the day of the visit. An electronic signature was used to authenticate this note.     --Jose R Da Silva PA-C

## 2023-02-14 NOTE — PATIENT INSTRUCTIONS
Monitor blood pressure 2-3 times/week and keep record to bring to next appointment - advised to notify me if it regularly stays > 140/90  Encouraged to use recent weight loss success to motivate change on her side with healthier diet choices and making time for an exercise routine  Recommend stationary bicycle and/or water exercise to avoid further strain on back and/or knee  Continue chronic medications as prescribed  Advised of the importance to get an annual diabetic eye exam done - offered referral but patient says she will schedule  Discussed the eligibility and benefits to keeping vaccines updated due to compromised immune system that diabetes does cause

## 2023-05-30 ENCOUNTER — TELEPHONE (OUTPATIENT)
Dept: SLEEP MEDICINE | Age: 38
End: 2023-05-30

## 2023-05-30 ENCOUNTER — OFFICE VISIT (OUTPATIENT)
Dept: SLEEP MEDICINE | Age: 38
End: 2023-05-30
Payer: COMMERCIAL

## 2023-05-30 VITALS
HEIGHT: 65 IN | SYSTOLIC BLOOD PRESSURE: 140 MMHG | HEART RATE: 71 BPM | WEIGHT: 293 LBS | BODY MASS INDEX: 48.82 KG/M2 | OXYGEN SATURATION: 100 % | TEMPERATURE: 97.9 F | DIASTOLIC BLOOD PRESSURE: 86 MMHG

## 2023-05-30 DIAGNOSIS — G47.33 OSA (OBSTRUCTIVE SLEEP APNEA): Primary | ICD-10-CM

## 2023-05-30 DIAGNOSIS — E66.01 MORBID OBESITY WITH BMI OF 50.0-59.9, ADULT (HCC): ICD-10-CM

## 2023-05-30 PROCEDURE — 99213 OFFICE O/P EST LOW 20 MIN: CPT | Performed by: NURSE PRACTITIONER

## 2023-05-30 PROCEDURE — 3079F DIAST BP 80-89 MM HG: CPT | Performed by: NURSE PRACTITIONER

## 2023-05-30 PROCEDURE — 3077F SYST BP >= 140 MM HG: CPT | Performed by: NURSE PRACTITIONER

## 2023-05-30 ASSESSMENT — SLEEP AND FATIGUE QUESTIONNAIRES
HOW LIKELY ARE YOU TO NOD OFF OR FALL ASLEEP WHILE SITTING AND TALKING TO SOMEONE: 0
HOW LIKELY ARE YOU TO NOD OFF OR FALL ASLEEP WHILE SITTING AND READING: 0
HOW LIKELY ARE YOU TO NOD OFF OR FALL ASLEEP WHILE SITTING QUIETLY AFTER LUNCH WITHOUT ALCOHOL: 0
HOW LIKELY ARE YOU TO NOD OFF OR FALL ASLEEP WHILE SITTING INACTIVE IN A PUBLIC PLACE: 0
HOW LIKELY ARE YOU TO NOD OFF OR FALL ASLEEP WHILE WATCHING TV: 0
HOW LIKELY ARE YOU TO NOD OFF OR FALL ASLEEP WHILE LYING DOWN TO REST IN THE AFTERNOON WHEN CIRCUMSTANCES PERMIT: 0
HOW LIKELY ARE YOU TO NOD OFF OR FALL ASLEEP IN A CAR, WHILE STOPPED FOR A FEW MINUTES IN TRAFFIC: 0
ESS TOTAL SCORE: 0
HOW LIKELY ARE YOU TO NOD OFF OR FALL ASLEEP WHEN YOU ARE A PASSENGER IN A CAR FOR AN HOUR WITHOUT A BREAK: 0

## 2023-05-30 NOTE — TELEPHONE ENCOUNTER
Order placed in [address] for CPAP SUPPLIES. Sent to [facility].

## 2023-05-30 NOTE — PATIENT INSTRUCTIONS
Continue BiPAP 17/9 cm H2O with nightly compliance  Start normal saline 2 sprays in each nostril in the morning and evening.   Also start Flonase 1 time daily  New supplies ordered  Recommendations as above  Follow-up in 1 year or sooner if needed

## 2023-07-11 ENCOUNTER — HOSPITAL ENCOUNTER (OUTPATIENT)
Dept: LAB | Age: 38
Discharge: HOME | End: 2023-07-14

## 2023-07-11 DIAGNOSIS — E55.9 VITAMIN D DEFICIENCY: ICD-10-CM

## 2023-07-11 DIAGNOSIS — I10 ELEVATED BLOOD PRESSURE READING IN OFFICE WITH DIAGNOSIS OF HYPERTENSION: ICD-10-CM

## 2023-07-11 DIAGNOSIS — E11.9 TYPE 2 DIABETES MELLITUS WITHOUT COMPLICATION, WITHOUT LONG-TERM CURRENT USE OF INSULIN (HCC): ICD-10-CM

## 2023-07-11 LAB
25(OH)D3 SERPL-MCNC: 32.9 NG/ML (ref 30–100)
ALBUMIN SERPL-MCNC: 3.6 G/DL (ref 3.5–5)
ALBUMIN/GLOB SERPL: 0.9 (ref 0.4–1.6)
ALP SERPL-CCNC: 49 U/L (ref 50–136)
ALT SERPL-CCNC: 41 U/L (ref 12–65)
ANION GAP SERPL CALC-SCNC: 5 MMOL/L (ref 2–11)
AST SERPL-CCNC: 17 U/L (ref 15–37)
BASOPHILS # BLD: 0 K/UL (ref 0–0.2)
BASOPHILS NFR BLD: 1 % (ref 0–2)
BILIRUB SERPL-MCNC: 0.6 MG/DL (ref 0.2–1.1)
BUN SERPL-MCNC: 10 MG/DL (ref 6–23)
CALCIUM SERPL-MCNC: 9.2 MG/DL (ref 8.3–10.4)
CHLORIDE SERPL-SCNC: 110 MMOL/L (ref 101–110)
CHOLEST SERPL-MCNC: 154 MG/DL
CO2 SERPL-SCNC: 25 MMOL/L (ref 21–32)
CREAT SERPL-MCNC: 0.9 MG/DL (ref 0.6–1)
DIFFERENTIAL METHOD BLD: ABNORMAL
EOSINOPHIL # BLD: 0.1 K/UL (ref 0–0.8)
EOSINOPHIL NFR BLD: 2 % (ref 0.5–7.8)
ERYTHROCYTE [DISTWIDTH] IN BLOOD BY AUTOMATED COUNT: 14.6 % (ref 11.9–14.6)
GLOBULIN SER CALC-MCNC: 3.9 G/DL (ref 2.8–4.5)
GLUCOSE SERPL-MCNC: 89 MG/DL (ref 65–100)
HCT VFR BLD AUTO: 38.8 % (ref 35.8–46.3)
HDLC SERPL-MCNC: 31 MG/DL (ref 40–60)
HDLC SERPL: 5
HGB BLD-MCNC: 11.9 G/DL (ref 11.7–15.4)
IMM GRANULOCYTES # BLD AUTO: 0 K/UL (ref 0–0.5)
IMM GRANULOCYTES NFR BLD AUTO: 0 % (ref 0–5)
LDLC SERPL CALC-MCNC: 97.2 MG/DL
LYMPHOCYTES # BLD: 1.9 K/UL (ref 0.5–4.6)
LYMPHOCYTES NFR BLD: 29 % (ref 13–44)
MCH RBC QN AUTO: 25.1 PG (ref 26.1–32.9)
MCHC RBC AUTO-ENTMCNC: 30.7 G/DL (ref 31.4–35)
MCV RBC AUTO: 81.9 FL (ref 82–102)
MONOCYTES # BLD: 0.6 K/UL (ref 0.1–1.3)
MONOCYTES NFR BLD: 10 % (ref 4–12)
NEUTS SEG # BLD: 3.9 K/UL (ref 1.7–8.2)
NEUTS SEG NFR BLD: 59 % (ref 43–78)
NRBC # BLD: 0 K/UL (ref 0–0.2)
PLATELET # BLD AUTO: 276 K/UL (ref 150–450)
PMV BLD AUTO: 11.7 FL (ref 9.4–12.3)
POTASSIUM SERPL-SCNC: 3.9 MMOL/L (ref 3.5–5.1)
PROT SERPL-MCNC: 7.5 G/DL (ref 6.3–8.2)
RBC # BLD AUTO: 4.74 M/UL (ref 4.05–5.2)
SODIUM SERPL-SCNC: 140 MMOL/L (ref 133–143)
TRIGL SERPL-MCNC: 129 MG/DL (ref 35–150)
VLDLC SERPL CALC-MCNC: 25.8 MG/DL (ref 6–23)
WBC # BLD AUTO: 6.6 K/UL (ref 4.3–11.1)

## 2023-07-12 LAB
EST. AVERAGE GLUCOSE BLD GHB EST-MCNC: 126 MG/DL
HBA1C MFR BLD: 6 % (ref 4.8–5.6)

## 2023-07-17 ENCOUNTER — OFFICE VISIT (OUTPATIENT)
Dept: INTERNAL MEDICINE CLINIC | Facility: CLINIC | Age: 38
End: 2023-07-17
Payer: COMMERCIAL

## 2023-07-17 VITALS
SYSTOLIC BLOOD PRESSURE: 150 MMHG | TEMPERATURE: 98.2 F | BODY MASS INDEX: 48.82 KG/M2 | OXYGEN SATURATION: 99 % | HEART RATE: 65 BPM | WEIGHT: 293 LBS | DIASTOLIC BLOOD PRESSURE: 90 MMHG | HEIGHT: 65 IN

## 2023-07-17 DIAGNOSIS — E11.9 TYPE 2 DIABETES MELLITUS WITHOUT COMPLICATION, WITHOUT LONG-TERM CURRENT USE OF INSULIN (HCC): Primary | ICD-10-CM

## 2023-07-17 DIAGNOSIS — I10 ESSENTIAL (PRIMARY) HYPERTENSION: ICD-10-CM

## 2023-07-17 DIAGNOSIS — E78.5 HYPERLIPIDEMIA LDL GOAL <100: ICD-10-CM

## 2023-07-17 DIAGNOSIS — Z12.83 SCREENING FOR SKIN CANCER: ICD-10-CM

## 2023-07-17 DIAGNOSIS — R71.0 DECREASED HEMOGLOBIN: ICD-10-CM

## 2023-07-17 DIAGNOSIS — E55.9 VITAMIN D DEFICIENCY: ICD-10-CM

## 2023-07-17 DIAGNOSIS — R09.82 ALLERGIC RHINITIS WITH POSTNASAL DRIP: ICD-10-CM

## 2023-07-17 DIAGNOSIS — R71.8 MICROCYTOSIS: ICD-10-CM

## 2023-07-17 DIAGNOSIS — L98.9 SKIN LESION OF CHEEK: ICD-10-CM

## 2023-07-17 DIAGNOSIS — E11.9 TYPE 2 DIABETES MELLITUS WITHOUT COMPLICATION, WITHOUT LONG-TERM CURRENT USE OF INSULIN (HCC): ICD-10-CM

## 2023-07-17 DIAGNOSIS — E66.01 MORBID OBESITY WITH BMI OF 50.0-59.9, ADULT (HCC): ICD-10-CM

## 2023-07-17 DIAGNOSIS — J30.9 ALLERGIC RHINITIS WITH POSTNASAL DRIP: ICD-10-CM

## 2023-07-17 PROCEDURE — 3075F SYST BP GE 130 - 139MM HG: CPT | Performed by: PHYSICIAN ASSISTANT

## 2023-07-17 PROCEDURE — 99215 OFFICE O/P EST HI 40 MIN: CPT | Performed by: PHYSICIAN ASSISTANT

## 2023-07-17 PROCEDURE — 3080F DIAST BP >= 90 MM HG: CPT | Performed by: PHYSICIAN ASSISTANT

## 2023-07-17 PROCEDURE — 3044F HG A1C LEVEL LT 7.0%: CPT | Performed by: PHYSICIAN ASSISTANT

## 2023-07-17 RX ORDER — AMLODIPINE BESYLATE 10 MG/1
10 TABLET ORAL DAILY
Qty: 90 TABLET | Refills: 3 | Status: SHIPPED | OUTPATIENT
Start: 2023-07-17

## 2023-07-17 RX ORDER — VALSARTAN AND HYDROCHLOROTHIAZIDE 320; 25 MG/1; MG/1
1 TABLET, FILM COATED ORAL DAILY
Qty: 90 TABLET | Refills: 3 | Status: SHIPPED | OUTPATIENT
Start: 2023-07-17

## 2023-07-17 RX ORDER — VALSARTAN 320 MG/1
320 TABLET ORAL DAILY
Qty: 90 TABLET | Refills: 3 | Status: SHIPPED | OUTPATIENT
Start: 2023-07-17 | End: 2023-07-17 | Stop reason: ALTCHOICE

## 2023-07-17 RX ORDER — ACETAMINOPHEN AND CODEINE PHOSPHATE 120; 12 MG/5ML; MG/5ML
SOLUTION ORAL
Qty: 28 TABLET | Refills: 0 | Status: SHIPPED | OUTPATIENT
Start: 2023-07-17

## 2023-07-17 ASSESSMENT — ENCOUNTER SYMPTOMS
SINUS PAIN: 0
SINUS PRESSURE: 0
SORE THROAT: 1
SHORTNESS OF BREATH: 0
RHINORRHEA: 0
EYE DISCHARGE: 1
TROUBLE SWALLOWING: 1

## 2023-07-17 ASSESSMENT — PATIENT HEALTH QUESTIONNAIRE - PHQ9
5. POOR APPETITE OR OVEREATING: 1
1. LITTLE INTEREST OR PLEASURE IN DOING THINGS: 0
SUM OF ALL RESPONSES TO PHQ QUESTIONS 1-9: 1
10. IF YOU CHECKED OFF ANY PROBLEMS, HOW DIFFICULT HAVE THESE PROBLEMS MADE IT FOR YOU TO DO YOUR WORK, TAKE CARE OF THINGS AT HOME, OR GET ALONG WITH OTHER PEOPLE: 0
SUM OF ALL RESPONSES TO PHQ QUESTIONS 1-9: 1
8. MOVING OR SPEAKING SO SLOWLY THAT OTHER PEOPLE COULD HAVE NOTICED. OR THE OPPOSITE - BEING SO FIDGETY OR RESTLESS THAT YOU HAVE BEEN MOVING AROUND A LOT MORE THAN USUAL: NOT AT ALL
6. FEELING BAD ABOUT YOURSELF - OR THAT YOU ARE A FAILURE OR HAVE LET YOURSELF OR YOUR FAMILY DOWN: 0
7. TROUBLE CONCENTRATING ON THINGS, SUCH AS READING THE NEWSPAPER OR WATCHING TELEVISION: 0
7. TROUBLE CONCENTRATING ON THINGS, SUCH AS READING THE NEWSPAPER OR WATCHING TELEVISION: NOT AT ALL
8. MOVING OR SPEAKING SO SLOWLY THAT OTHER PEOPLE COULD HAVE NOTICED. OR THE OPPOSITE, BEING SO FIGETY OR RESTLESS THAT YOU HAVE BEEN MOVING AROUND A LOT MORE THAN USUAL: 0
3. TROUBLE FALLING OR STAYING ASLEEP: 0
SUM OF ALL RESPONSES TO PHQ9 QUESTIONS 1 & 2: 0
10. IF YOU CHECKED OFF ANY PROBLEMS, HOW DIFFICULT HAVE THESE PROBLEMS MADE IT FOR YOU TO DO YOUR WORK, TAKE CARE OF THINGS AT HOME, OR GET ALONG WITH OTHER PEOPLE: NOT DIFFICULT AT ALL
2. FEELING DOWN, DEPRESSED OR HOPELESS: NOT AT ALL
SUM OF ALL RESPONSES TO PHQ QUESTIONS 1-9: 1
5. POOR APPETITE OR OVEREATING: SEVERAL DAYS
9. THOUGHTS THAT YOU WOULD BE BETTER OFF DEAD, OR OF HURTING YOURSELF: 0
1. LITTLE INTEREST OR PLEASURE IN DOING THINGS: NOT AT ALL
4. FEELING TIRED OR HAVING LITTLE ENERGY: NOT AT ALL
SUM OF ALL RESPONSES TO PHQ QUESTIONS 1-9: 1
4. FEELING TIRED OR HAVING LITTLE ENERGY: 0
SUM OF ALL RESPONSES TO PHQ QUESTIONS 1-9: 1
2. FEELING DOWN, DEPRESSED OR HOPELESS: 0
3. TROUBLE FALLING OR STAYING ASLEEP: NOT AT ALL
6. FEELING BAD ABOUT YOURSELF - OR THAT YOU ARE A FAILURE OR HAVE LET YOURSELF OR YOUR FAMILY DOWN: NOT AT ALL
9. THOUGHTS THAT YOU WOULD BE BETTER OFF DEAD, OR OF HURTING YOURSELF: NOT AT ALL

## 2023-07-17 NOTE — PATIENT INSTRUCTIONS
Trial start on Ozempic 0.25 mg weekly x 4 weeks then increase to 0.5 mg weekly if tolerating well without nausea  Demonstrated how to self inject with sample pen provided to patient  Emphasized the importance of dietary commitments and exercise efforts to allow for optimal results with Ozempic use  Switch to Diovan /25 mg daily to aid in gaining better control of blood pressure  Discussed the importance of staying well hydrated with water  Advised to use OTC Flonase or Nasacort 2 sprays/nostril once daily x 2 week intervals  Instructed on how to use nasal spray properly for optimal results - insert tip into nostril, angle slightly outward, then squirt according to directions  Continue chronic medications as prescribed  Encouraged improved compliance with Metformin ER even if it's not taken at the same time every day  Reminded that she needs to complete a diabetic eye exam once a year to monitor for any signs of diabetic damage to the eyes  Will add ferritin & transferrin saturation to blood work already drawn to evaluate for underlying iron deficiency related to her menstrual cycle

## 2023-07-18 DIAGNOSIS — D50.9 IRON DEFICIENCY ANEMIA, UNSPECIFIED IRON DEFICIENCY ANEMIA TYPE: Primary | ICD-10-CM

## 2023-07-18 LAB
CREAT UR-MCNC: 162 MG/DL
FERRITIN SERPL-MCNC: 10 NG/ML (ref 8–388)
IRON SATN MFR SERPL: 9 %
IRON SERPL-MCNC: 44 UG/DL (ref 35–150)
MICROALBUMIN UR-MCNC: 1.46 MG/DL
MICROALBUMIN/CREAT UR-RTO: 9 MG/G (ref 0–30)
TIBC SERPL-MCNC: 501 UG/DL (ref 250–450)

## 2023-07-18 RX ORDER — FERROUS SULFATE 325(65) MG
325 TABLET ORAL
Qty: 90 TABLET | Refills: 1 | Status: SHIPPED | OUTPATIENT
Start: 2023-07-18

## 2023-08-07 RX ORDER — ACETAMINOPHEN AND CODEINE PHOSPHATE 120; 12 MG/5ML; MG/5ML
SOLUTION ORAL
Qty: 28 TABLET | Refills: 0 | OUTPATIENT
Start: 2023-08-07

## 2023-08-14 RX ORDER — ACETAMINOPHEN AND CODEINE PHOSPHATE 120; 12 MG/5ML; MG/5ML
1 SOLUTION ORAL DAILY
Qty: 28 TABLET | Refills: 0 | Status: SHIPPED | OUTPATIENT
Start: 2023-08-14

## 2023-08-28 ASSESSMENT — ENCOUNTER SYMPTOMS: SHORTNESS OF BREATH: 0

## 2023-08-28 NOTE — PROGRESS NOTES
Wendy Luis (:  1985) is a 41 y.o. female,Established patient, here for evaluation of the following chief complaint(s):  Medication Check (Pt is here for a 6 week medication check since starting on Ozempic and since switching to Diovan /25 mg daily. )         ASSESSMENT/PLAN:  1. Type 2 diabetes mellitus without complication, without long-term current use of insulin (HCC)  -     Semaglutide,0.25 or 0.5MG/DOS, (OZEMPIC, 0.25 OR 0.5 MG/DOSE,) 2 MG/3ML SOPN; Inject 0.5 mg into the skin once a week, Disp-3 mL, R-5Normal  2. Essential (primary) hypertension  -     Basic Metabolic Panel; Future  3. Morbid obesity with BMI of 50.0-59.9, adult [facility])      Patient Instructions   Increase Ozempic to 0.5 mg weekly - attempt to fill prescription sent to pharmacy prior to running out of sample so we have time to get medication approved if prior authorization is required  Reminded to activate savings card provided in sample and apply it towards the purchase of Ozempic at the pharmacy  Explained that she should eat the most important (nutrient valuable) parts of her meal first in case she fills up before she can finish - this will hopefully help sustain her longer if she is getting enough protein in during her meals  Emphasized the importance of staying well hydrated with plenty of water to avoid episodes of lightheadedness  Monitor blood pressure 1-2 times/week to ensure that it is not dropping too low  Continue chronic medications as prescribed  Suggest sucking on a sugar-free hard candy or chew sugar-free chewing gum if she feels the urge to snack/eat out of habit while relaxing        Return in about 2 months (around 10/29/2023) for diabetes f/u. Subjective   SUBJECTIVE/OBJECTIVE:  HPI  The patient is a 41 y.o. female who is seen for follow up of diabetes. Current monitoring regimen: patient does not check sugars.   Glucose monitoring frequency: 0 times daily  Home blood sugar records: patient

## 2023-08-29 ENCOUNTER — OFFICE VISIT (OUTPATIENT)
Dept: INTERNAL MEDICINE CLINIC | Facility: CLINIC | Age: 38
End: 2023-08-29
Payer: COMMERCIAL

## 2023-08-29 VITALS
OXYGEN SATURATION: 98 % | TEMPERATURE: 98.1 F | WEIGHT: 293 LBS | DIASTOLIC BLOOD PRESSURE: 88 MMHG | SYSTOLIC BLOOD PRESSURE: 122 MMHG | HEIGHT: 65 IN | BODY MASS INDEX: 48.82 KG/M2 | HEART RATE: 66 BPM

## 2023-08-29 DIAGNOSIS — I10 ESSENTIAL (PRIMARY) HYPERTENSION: ICD-10-CM

## 2023-08-29 DIAGNOSIS — E66.01 MORBID OBESITY WITH BMI OF 50.0-59.9, ADULT (HCC): ICD-10-CM

## 2023-08-29 DIAGNOSIS — E11.9 TYPE 2 DIABETES MELLITUS WITHOUT COMPLICATION, WITHOUT LONG-TERM CURRENT USE OF INSULIN (HCC): Primary | ICD-10-CM

## 2023-08-29 LAB
ANION GAP SERPL CALC-SCNC: 4 MMOL/L (ref 2–11)
BUN SERPL-MCNC: 8 MG/DL (ref 6–23)
CALCIUM SERPL-MCNC: 8.8 MG/DL (ref 8.3–10.4)
CHLORIDE SERPL-SCNC: 107 MMOL/L (ref 101–110)
CO2 SERPL-SCNC: 28 MMOL/L (ref 21–32)
CREAT SERPL-MCNC: 0.9 MG/DL (ref 0.6–1)
GLUCOSE SERPL-MCNC: 114 MG/DL (ref 65–100)
POTASSIUM SERPL-SCNC: 3.4 MMOL/L (ref 3.5–5.1)
SODIUM SERPL-SCNC: 139 MMOL/L (ref 133–143)

## 2023-08-29 PROCEDURE — 3079F DIAST BP 80-89 MM HG: CPT | Performed by: PHYSICIAN ASSISTANT

## 2023-08-29 PROCEDURE — 3074F SYST BP LT 130 MM HG: CPT | Performed by: PHYSICIAN ASSISTANT

## 2023-08-29 PROCEDURE — 3044F HG A1C LEVEL LT 7.0%: CPT | Performed by: PHYSICIAN ASSISTANT

## 2023-08-29 PROCEDURE — 99213 OFFICE O/P EST LOW 20 MIN: CPT | Performed by: PHYSICIAN ASSISTANT

## 2023-08-29 RX ORDER — SEMAGLUTIDE 0.68 MG/ML
0.5 INJECTION, SOLUTION SUBCUTANEOUS WEEKLY
Qty: 3 ML | Refills: 5 | Status: SHIPPED | OUTPATIENT
Start: 2023-08-29

## 2023-08-29 RX ORDER — SEMAGLUTIDE 0.68 MG/ML
0.25 INJECTION, SOLUTION SUBCUTANEOUS WEEKLY
COMMUNITY
End: 2023-08-29 | Stop reason: SDUPTHER

## 2023-08-29 ASSESSMENT — PATIENT HEALTH QUESTIONNAIRE - PHQ9
SUM OF ALL RESPONSES TO PHQ QUESTIONS 1-9: 0
SUM OF ALL RESPONSES TO PHQ9 QUESTIONS 1 & 2: 0
2. FEELING DOWN, DEPRESSED OR HOPELESS: 0
1. LITTLE INTEREST OR PLEASURE IN DOING THINGS: 0

## 2023-08-29 NOTE — RESULT ENCOUNTER NOTE
Potassium is just a tiny bit low - please increase daily intake of potassium rich foods including dried apricots, raisins, potatoes, spinach, bananas, beans, and/or avocado. We will keep an eye on this but for now I just want her to increase dietary intake to try and compensate for the electrolytes being taken out by the diuretic.

## 2023-08-29 NOTE — PATIENT INSTRUCTIONS
Increase Ozempic to 0.5 mg weekly - attempt to fill prescription sent to pharmacy prior to running out of sample so we have time to get medication approved if prior authorization is required  Reminded to activate savings card provided in sample and apply it towards the purchase of Ozempic at the pharmacy  Explained that she should eat the most important (nutrient valuable) parts of her meal first in case she fills up before she can finish - this will hopefully help sustain her longer if she is getting enough protein in during her meals  Emphasized the importance of staying well hydrated with plenty of water to avoid episodes of lightheadedness  Monitor blood pressure 1-2 times/week to ensure that it is not dropping too low  Continue chronic medications as prescribed  Suggest sucking on a sugar-free hard candy or chew sugar-free chewing gum if she feels the urge to snack/eat out of habit while relaxing

## 2023-09-05 RX ORDER — ACETAMINOPHEN AND CODEINE PHOSPHATE 120; 12 MG/5ML; MG/5ML
SOLUTION ORAL
Qty: 28 TABLET | Refills: 0 | OUTPATIENT
Start: 2023-09-05

## 2023-09-12 RX ORDER — ACETAMINOPHEN AND CODEINE PHOSPHATE 120; 12 MG/5ML; MG/5ML
1 SOLUTION ORAL DAILY
Qty: 28 TABLET | Refills: 0 | OUTPATIENT
Start: 2023-09-12

## 2023-09-25 RX ORDER — ACETAMINOPHEN AND CODEINE PHOSPHATE 120; 12 MG/5ML; MG/5ML
1 SOLUTION ORAL DAILY
Qty: 28 TABLET | Refills: 0 | OUTPATIENT
Start: 2023-09-25

## 2023-09-27 RX ORDER — ACETAMINOPHEN AND CODEINE PHOSPHATE 120; 12 MG/5ML; MG/5ML
1 SOLUTION ORAL DAILY
Qty: 28 TABLET | Refills: 0 | OUTPATIENT
Start: 2023-09-27

## 2023-10-16 ENCOUNTER — HOSPITAL ENCOUNTER (OUTPATIENT)
Dept: LAB | Age: 38
Discharge: HOME | End: 2023-10-19

## 2023-10-27 ASSESSMENT — ENCOUNTER SYMPTOMS: SHORTNESS OF BREATH: 0

## 2023-10-27 NOTE — PROGRESS NOTES
a severe allergic reaction to a flu vaccine. Tell your vaccination provider if you have:  a weak immune system (caused by disease or by using certain medicine); or  a history of Guillain-[place] syndrome (within 6 weeks after receiving a flu vaccine). You can still receive a vaccine if you have a minor cold. In the case of a more severe illness with a fever or any type of infection, wait until you get better before receiving this vaccine. Tell your vaccination provider if you are pregnant or breastfeeding. The Centers for Disease Control and Prevention recommends that pregnant women get a flu shot during any trimester of pregnancy to protect themselves and their  babies from flu. The nasal spray form of influenza vaccine is not recommended for use in pregnant women. How is this vaccine given? Some brands of this vaccine are made for use in adults and not in children. Your child's vaccination provider can recommend the best influenza virus vaccine for your child. This vaccine is given as an injection (shot) into a muscle. Children 6 months to 6years old may need a second flu shot 4 weeks after receiving the first vaccine. The influenza virus vaccine is usually given in October or November. Follow your doctor's instructions or the schedule recommended by your local health department. Since the influenza virus vaccine is redeveloped each year for specific strains of influenza, you should receive a flu vaccine every year. What happens if I miss a dose? Call your doctor if you forget to receive your yearly flu shot in October or November, or if your child misses a booster dose. What happens if I overdose? An overdose of this vaccine is unlikely to occur. What should I avoid before or after receiving this vaccine? Follow your vaccination provider's instructions about any restrictions on food, beverages, or activity. What are the possible side effects of influenza virus vaccine?   Get emergency

## 2023-10-30 ENCOUNTER — OFFICE VISIT (OUTPATIENT)
Dept: INTERNAL MEDICINE CLINIC | Facility: CLINIC | Age: 38
End: 2023-10-30
Payer: COMMERCIAL

## 2023-10-30 VITALS
TEMPERATURE: 98.2 F | WEIGHT: 293 LBS | HEIGHT: 65 IN | BODY MASS INDEX: 48.82 KG/M2 | SYSTOLIC BLOOD PRESSURE: 132 MMHG | DIASTOLIC BLOOD PRESSURE: 80 MMHG | HEART RATE: 76 BPM | OXYGEN SATURATION: 99 %

## 2023-10-30 DIAGNOSIS — E11.9 TYPE 2 DIABETES MELLITUS WITHOUT COMPLICATION, WITHOUT LONG-TERM CURRENT USE OF INSULIN (HCC): Primary | ICD-10-CM

## 2023-10-30 DIAGNOSIS — E55.9 VITAMIN D DEFICIENCY: Primary | ICD-10-CM

## 2023-10-30 DIAGNOSIS — I10 ESSENTIAL (PRIMARY) HYPERTENSION: ICD-10-CM

## 2023-10-30 DIAGNOSIS — D50.0 IRON DEFICIENCY ANEMIA DUE TO CHRONIC BLOOD LOSS: ICD-10-CM

## 2023-10-30 DIAGNOSIS — Z23 NEED FOR IMMUNIZATION AGAINST INFLUENZA: ICD-10-CM

## 2023-10-30 PROCEDURE — 99214 OFFICE O/P EST MOD 30 MIN: CPT | Performed by: PHYSICIAN ASSISTANT

## 2023-10-30 PROCEDURE — 90471 IMMUNIZATION ADMIN: CPT | Performed by: PHYSICIAN ASSISTANT

## 2023-10-30 PROCEDURE — 3079F DIAST BP 80-89 MM HG: CPT | Performed by: PHYSICIAN ASSISTANT

## 2023-10-30 PROCEDURE — 3044F HG A1C LEVEL LT 7.0%: CPT | Performed by: PHYSICIAN ASSISTANT

## 2023-10-30 PROCEDURE — 3075F SYST BP GE 130 - 139MM HG: CPT | Performed by: PHYSICIAN ASSISTANT

## 2023-10-30 PROCEDURE — 90694 VACC AIIV4 NO PRSRV 0.5ML IM: CPT | Performed by: PHYSICIAN ASSISTANT

## 2023-10-30 ASSESSMENT — ENCOUNTER SYMPTOMS
VOMITING: 0
NAUSEA: 0
ABDOMINAL DISTENTION: 1
CONSTIPATION: 0
DIARRHEA: 0
BLOOD IN STOOL: 0
ABDOMINAL PAIN: 1

## 2023-10-30 ASSESSMENT — PATIENT HEALTH QUESTIONNAIRE - PHQ9
SUM OF ALL RESPONSES TO PHQ QUESTIONS 1-9: 0
1. LITTLE INTEREST OR PLEASURE IN DOING THINGS: 0
SUM OF ALL RESPONSES TO PHQ9 QUESTIONS 1 & 2: 0
2. FEELING DOWN, DEPRESSED OR HOPELESS: 0

## 2023-10-30 NOTE — PATIENT INSTRUCTIONS
warrant that uses outside of the [facility] are appropriate, unless specifically indicated otherwise. [facility]'s drug information does not endorse drugs, diagnose patients or recommend therapy. [facility] drug information is an informational resource designed to assist licensed healthcare practitioners in caring for their patients and/or to serve consumers viewing this service as a supplement to, and not a substitute for, the expertise, skill, knowledge and judgment of healthcare practitioners. The absence of a warning for a given drug or drug combination in no way should be construed to indicate that the drug or drug combination is safe, effective or appropriate for any given patient. [facility] does not assume any responsibility for any aspect of healthcare administered with the aid of information [facility] provides. The information contained herein is not intended to cover all possible uses, directions, precautions, warnings, drug interactions, allergic reactions, or adverse effects. If you have questions about the drugs you are taking, check with your doctor, nurse or pharmacist.  Copyright 4494-5988 [address]: 9.01. Revision date: 10/7/2021. Care instructions adapted under license by [facility] ([facility]). If you have questions about a medical condition or this instruction, always ask your healthcare professional. [address] any warranty or liability for your use of this information.

## 2023-11-27 ENCOUNTER — OFFICE VISIT (OUTPATIENT)
Dept: OBGYN CLINIC | Age: 38
End: 2023-11-27
Payer: COMMERCIAL

## 2023-11-27 VITALS
WEIGHT: 293 LBS | SYSTOLIC BLOOD PRESSURE: 108 MMHG | HEIGHT: 65 IN | BODY MASS INDEX: 48.82 KG/M2 | DIASTOLIC BLOOD PRESSURE: 76 MMHG

## 2023-11-27 DIAGNOSIS — N92.0 MENORRHAGIA WITH REGULAR CYCLE: ICD-10-CM

## 2023-11-27 DIAGNOSIS — Z11.51 SCREENING FOR HUMAN PAPILLOMAVIRUS (HPV): ICD-10-CM

## 2023-11-27 DIAGNOSIS — Z01.419 WELL WOMAN EXAM WITH ROUTINE GYNECOLOGICAL EXAM: Primary | ICD-10-CM

## 2023-11-27 DIAGNOSIS — Z12.4 SCREENING FOR CERVICAL CANCER: ICD-10-CM

## 2023-11-27 PROCEDURE — 99395 PREV VISIT EST AGE 18-39: CPT | Performed by: OBSTETRICS & GYNECOLOGY

## 2023-11-27 PROCEDURE — 3074F SYST BP LT 130 MM HG: CPT | Performed by: OBSTETRICS & GYNECOLOGY

## 2023-11-27 PROCEDURE — 3078F DIAST BP <80 MM HG: CPT | Performed by: OBSTETRICS & GYNECOLOGY

## 2023-11-27 RX ORDER — ACETAMINOPHEN AND CODEINE PHOSPHATE 120; 12 MG/5ML; MG/5ML
1 SOLUTION ORAL DAILY
Qty: 84 TABLET | Refills: 4 | Status: SHIPPED | OUTPATIENT
Start: 2023-11-27

## 2023-11-27 NOTE — PROGRESS NOTES
normal size and consistency and no palpable nodules. Chest and Lung Exam   Chest and lung exam reveals - on auscultation, normal breath sounds, no adventitious sounds and normal vocal resonance. Breast   Breast - Left - Normal. Right - Normal.     Cardiovascular   Cardiovascular examination reveals - normal heart sounds, regular rate and rhythm with no murmurs. Abdomen   Inspection: - Inspection Normal.   Palpation/Percussion: Palpation and Percussion of the abdomen reveal - Non Tender, No Rebound tenderness, No Rigidity (guarding), No hepatosplenomegaly, No Palpable abdominal masses and Soft. Auscultation: Auscultation of the abdomen reveals - Bowel sounds normal.     Female Genitourinary     External Genitalia   Vulva: - Normal. Perineum - Normal. Bartholin's Gland - Bilateral - Normal. Clitoris - Normal.   Introitus: Characteristics - Normal.   Urethra: Characteristics - Normal.     Speculum & Bimanual   Vagina: Vaginal Mucosa - Normal.   Vaginal Wall: - Normal.   Vaginal Lesions - None. Cervix: Characteristics - Normal.   Uterus: Characteristics - Normal.   Adnexa: - Normal.   Bladder - Normal.     Peripheral Vascular   Normal    Neuropsychiatric   Examination of related systems reveals - The patient is well-nourished and well-groomed. Mental status exam performed with findings of - Oriented X3 with appropriate mood and affect. Musculoskeletal  Normal      General Lymphatics  Normal           Medical problems and test results were reviewed with the patient today. ASSESSMENT and PLAN    1. Well woman exam with routine gynecological exam  -     PAP IG, HPV Rfx HPV 16/18,45; Future  2. Screening for human papillomavirus (HPV)  -     PAP IG, HPV Rfx HPV 16/18,45; Future  3. Screening for cervical cancer  -     PAP IG, HPV Rfx HPV 16/18,45; Future  4. Menorrhagia with regular cycle  -     norethindrone (MICRONOR) 0.35 MG tablet;  Take 1 tablet by mouth daily, Disp-84 tablet, R-4Normal
No

## 2023-12-02 LAB
COLLECTION METHOD: NORMAL
CYTOLOGIST CVX/VAG CYTO: NORMAL
CYTOLOGY CVX/VAG DOC THIN PREP: NORMAL
DATE OF LMP: NORMAL
HPV APTIMA: NEGATIVE
Lab: NORMAL
OTHER PT INFO: NORMAL
PAP SOURCE: NORMAL
PATH REPORT.FINAL DX SPEC: NORMAL
PREV CYTO INFO: NEGATIVE
PREV TREATMENT RESULTS: NORMAL
PREV TREATMENT: NORMAL
STAT OF ADQ CVX/VAG CYTO-IMP: NORMAL

## 2024-01-08 DIAGNOSIS — D50.9 IRON DEFICIENCY ANEMIA, UNSPECIFIED IRON DEFICIENCY ANEMIA TYPE: ICD-10-CM

## 2024-01-08 RX ORDER — FERROUS SULFATE 325(65) MG
1 TABLET ORAL
Qty: 90 TABLET | Refills: 1 | OUTPATIENT
Start: 2024-01-08

## 2024-01-23 ENCOUNTER — HOSPITAL ENCOUNTER (OUTPATIENT)
Dept: LAB | Age: 39
Discharge: HOME | End: 2024-01-26

## 2024-01-23 DIAGNOSIS — I10 ESSENTIAL (PRIMARY) HYPERTENSION: ICD-10-CM

## 2024-01-23 DIAGNOSIS — E11.9 TYPE 2 DIABETES MELLITUS WITHOUT COMPLICATION, WITHOUT LONG-TERM CURRENT USE OF INSULIN (HCC): ICD-10-CM

## 2024-01-23 DIAGNOSIS — E55.9 VITAMIN D DEFICIENCY: ICD-10-CM

## 2024-01-23 DIAGNOSIS — D50.0 IRON DEFICIENCY ANEMIA DUE TO CHRONIC BLOOD LOSS: ICD-10-CM

## 2024-01-23 LAB
25(OH)D3 SERPL-MCNC: 59.4 NG/ML (ref 30–100)
ALBUMIN SERPL-MCNC: 3.9 G/DL (ref 3.5–5)
ALBUMIN/GLOB SERPL: 1.1 (ref 0.4–1.6)
ALP SERPL-CCNC: 42 U/L (ref 50–136)
ALT SERPL-CCNC: 32 U/L (ref 12–65)
ANION GAP SERPL CALC-SCNC: 5 MMOL/L (ref 2–11)
AST SERPL-CCNC: 20 U/L (ref 15–37)
BASOPHILS # BLD: 0 K/UL (ref 0–0.2)
BASOPHILS NFR BLD: 0 % (ref 0–2)
BILIRUB SERPL-MCNC: 0.7 MG/DL (ref 0.2–1.1)
BUN SERPL-MCNC: 9 MG/DL (ref 6–23)
CALCIUM SERPL-MCNC: 9.2 MG/DL (ref 8.3–10.4)
CHLORIDE SERPL-SCNC: 107 MMOL/L (ref 103–113)
CO2 SERPL-SCNC: 27 MMOL/L (ref 21–32)
CREAT SERPL-MCNC: 0.8 MG/DL (ref 0.6–1)
DIFFERENTIAL METHOD BLD: ABNORMAL
EOSINOPHIL # BLD: 0.2 K/UL (ref 0–0.8)
EOSINOPHIL NFR BLD: 2 % (ref 0.5–7.8)
ERYTHROCYTE [DISTWIDTH] IN BLOOD BY AUTOMATED COUNT: 13.7 % (ref 11.9–14.6)
EST. AVERAGE GLUCOSE BLD GHB EST-MCNC: 111 MG/DL
FERRITIN SERPL-MCNC: 20 NG/ML (ref 8–388)
GLOBULIN SER CALC-MCNC: 3.6 G/DL (ref 2.8–4.5)
GLUCOSE SERPL-MCNC: 95 MG/DL (ref 65–100)
HBA1C MFR BLD: 5.5 % (ref 4.8–5.6)
HCT VFR BLD AUTO: 41.5 % (ref 35.8–46.3)
HGB BLD-MCNC: 13 G/DL (ref 11.7–15.4)
IMM GRANULOCYTES # BLD AUTO: 0 K/UL (ref 0–0.5)
IMM GRANULOCYTES NFR BLD AUTO: 0 % (ref 0–5)
IRON SATN MFR SERPL: 13 %
IRON SERPL-MCNC: 59 UG/DL (ref 35–150)
LYMPHOCYTES # BLD: 1.9 K/UL (ref 0.5–4.6)
LYMPHOCYTES NFR BLD: 28 % (ref 13–44)
MCH RBC QN AUTO: 26.4 PG (ref 26.1–32.9)
MCHC RBC AUTO-ENTMCNC: 31.3 G/DL (ref 31.4–35)
MCV RBC AUTO: 84.3 FL (ref 82–102)
MONOCYTES # BLD: 0.6 K/UL (ref 0.1–1.3)
MONOCYTES NFR BLD: 8 % (ref 4–12)
NEUTS SEG # BLD: 4.2 K/UL (ref 1.7–8.2)
NEUTS SEG NFR BLD: 62 % (ref 43–78)
NRBC # BLD: 0 K/UL (ref 0–0.2)
PLATELET # BLD AUTO: 284 K/UL (ref 150–450)
PMV BLD AUTO: 11.3 FL (ref 9.4–12.3)
POTASSIUM SERPL-SCNC: 3.7 MMOL/L (ref 3.5–5.1)
PROT SERPL-MCNC: 7.5 G/DL (ref 6.3–8.2)
RBC # BLD AUTO: 4.92 M/UL (ref 4.05–5.2)
SODIUM SERPL-SCNC: 139 MMOL/L (ref 136–146)
TIBC SERPL-MCNC: 443 UG/DL (ref 250–450)
WBC # BLD AUTO: 6.8 K/UL (ref 4.3–11.1)

## 2024-01-24 DIAGNOSIS — D50.9 IRON DEFICIENCY ANEMIA, UNSPECIFIED IRON DEFICIENCY ANEMIA TYPE: ICD-10-CM

## 2024-01-24 RX ORDER — FERROUS SULFATE 325(65) MG
1 TABLET ORAL
Qty: 90 TABLET | Refills: 1 | OUTPATIENT
Start: 2024-01-24

## 2024-01-26 NOTE — PROGRESS NOTES
Lynn Zelaya (:  1985) is a 38 y.o. female,Established patient, here for evaluation of the following chief complaint(s):  Diabetes (Pt is here for a 3 month diabetic f/u with lab review. ) and Hypertension         ASSESSMENT/PLAN:  1. Type 2 diabetes mellitus without complication, without long-term current use of insulin (HCC)  -     metFORMIN (GLUCOPHAGE XR) 500 MG extended release tablet; Take 1 tablet by mouth daily (with breakfast), Disp-90 tablet, R-1Normal  -     Semaglutide,0.25 or 0.5MG/DOS, (OZEMPIC, 0.25 OR 0.5 MG/DOSE,) 2 MG/3ML SOPN; Inject 0.5 mg into the skin once a week, Disp-3 mL, R-5Normal  -     Comprehensive Metabolic Panel; Future  -     Lipid Panel; Future  2. Essential (primary) hypertension  -     Comprehensive Metabolic Panel; Future  3. Iron deficiency anemia due to chronic blood loss  -     ferrous sulfate (IRON 325) 325 (65 Fe) MG tablet; Take 1 tablet by mouth daily (with breakfast), Disp-90 tablet, R-1Normal  -     CBC with Auto Differential; Future  -     Transferrin Saturation; Future  4. Morbid obesity with BMI of 45.0-49.9, adult ([facility])  5. Vitamin D deficiency      Patient Instructions   Recommend trial reduction of Omeprazole to 20 mg every other day - if no symptom increase is noted we may do a trial off of it completely since she's already lost a significant amount of weight  Discussed the interference that Omeprazole has with iron absorption and explained that it would be ideal if these meds could be   Encouraged to re-engage stricter avoidance of sweets/sugars since they act like a drug for the body to get re-addicted to every time they're consumed  Agree with consistent adherence to staying well hydrated  Will consider a wean/trial off Metformin down the road if patient desires        Return in about 4 months (around 2024) for diabetes f/u + foot exam.         Subjective   SUBJECTIVE/OBJECTIVE:  HPI  The patient is a 38 y.o. female who is seen for

## 2024-01-30 ENCOUNTER — OFFICE VISIT (OUTPATIENT)
Dept: INTERNAL MEDICINE CLINIC | Facility: CLINIC | Age: 39
End: 2024-01-30
Payer: COMMERCIAL

## 2024-01-30 VITALS
TEMPERATURE: 98.6 F | HEART RATE: 82 BPM | DIASTOLIC BLOOD PRESSURE: 82 MMHG | WEIGHT: 293 LBS | HEIGHT: 65 IN | SYSTOLIC BLOOD PRESSURE: 130 MMHG | BODY MASS INDEX: 48.82 KG/M2 | OXYGEN SATURATION: 100 %

## 2024-01-30 DIAGNOSIS — E66.01 MORBID OBESITY WITH BMI OF 45.0-49.9, ADULT (HCC): ICD-10-CM

## 2024-01-30 DIAGNOSIS — D50.0 IRON DEFICIENCY ANEMIA DUE TO CHRONIC BLOOD LOSS: ICD-10-CM

## 2024-01-30 DIAGNOSIS — E11.9 TYPE 2 DIABETES MELLITUS WITHOUT COMPLICATION, WITHOUT LONG-TERM CURRENT USE OF INSULIN (HCC): Primary | ICD-10-CM

## 2024-01-30 DIAGNOSIS — E55.9 VITAMIN D DEFICIENCY: ICD-10-CM

## 2024-01-30 DIAGNOSIS — I10 ESSENTIAL (PRIMARY) HYPERTENSION: ICD-10-CM

## 2024-01-30 PROCEDURE — 3079F DIAST BP 80-89 MM HG: CPT | Performed by: PHYSICIAN ASSISTANT

## 2024-01-30 PROCEDURE — 3075F SYST BP GE 130 - 139MM HG: CPT | Performed by: PHYSICIAN ASSISTANT

## 2024-01-30 PROCEDURE — 99214 OFFICE O/P EST MOD 30 MIN: CPT | Performed by: PHYSICIAN ASSISTANT

## 2024-01-30 PROCEDURE — 3044F HG A1C LEVEL LT 7.0%: CPT | Performed by: PHYSICIAN ASSISTANT

## 2024-01-30 RX ORDER — SEMAGLUTIDE 0.68 MG/ML
0.5 INJECTION, SOLUTION SUBCUTANEOUS WEEKLY
Qty: 3 ML | Refills: 5 | Status: SHIPPED | OUTPATIENT
Start: 2024-01-30

## 2024-01-30 RX ORDER — FERROUS SULFATE 325(65) MG
325 TABLET ORAL
Qty: 90 TABLET | Refills: 1 | Status: SHIPPED | OUTPATIENT
Start: 2024-01-30

## 2024-01-30 RX ORDER — METFORMIN HYDROCHLORIDE 500 MG/1
500 TABLET, EXTENDED RELEASE ORAL
Qty: 90 TABLET | Refills: 1 | Status: SHIPPED | OUTPATIENT
Start: 2024-01-30

## 2024-01-30 ASSESSMENT — PATIENT HEALTH QUESTIONNAIRE - PHQ9
SUM OF ALL RESPONSES TO PHQ QUESTIONS 1-9: 0
SUM OF ALL RESPONSES TO PHQ QUESTIONS 1-9: 0
SUM OF ALL RESPONSES TO PHQ9 QUESTIONS 1 & 2: 0
2. FEELING DOWN, DEPRESSED OR HOPELESS: 0
SUM OF ALL RESPONSES TO PHQ QUESTIONS 1-9: 0
1. LITTLE INTEREST OR PLEASURE IN DOING THINGS: 0
SUM OF ALL RESPONSES TO PHQ QUESTIONS 1-9: 0

## 2024-01-30 ASSESSMENT — ENCOUNTER SYMPTOMS
DIARRHEA: 1
CONSTIPATION: 1

## 2024-01-30 NOTE — PATIENT INSTRUCTIONS
Recommend trial reduction of Omeprazole to 20 mg every other day - if no symptom increase is noted we may do a trial off of it completely since she's already lost a significant amount of weight  Discussed the interference that Omeprazole has with iron absorption and explained that it would be ideal if these meds could be   Encouraged to re-engage stricter avoidance of sweets/sugars since they act like a drug for the body to get re-addicted to every time they're consumed  Agree with consistent adherence to staying well hydrated  Will consider a wean/trial off Metformin down the road if patient desires

## 2024-05-29 ENCOUNTER — PATIENT MESSAGE (OUTPATIENT)
Dept: SLEEP MEDICINE | Age: 39
End: 2024-05-29

## 2024-05-30 ENCOUNTER — NURSE ONLY (OUTPATIENT)
Dept: INTERNAL MEDICINE CLINIC | Facility: CLINIC | Age: 39
End: 2024-05-30

## 2024-05-30 ENCOUNTER — OFFICE VISIT (OUTPATIENT)
Dept: SLEEP MEDICINE | Age: 39
End: 2024-05-30
Payer: COMMERCIAL

## 2024-05-30 VITALS
BODY MASS INDEX: 48.82 KG/M2 | OXYGEN SATURATION: 95 % | HEIGHT: 65 IN | WEIGHT: 293 LBS | HEART RATE: 76 BPM | SYSTOLIC BLOOD PRESSURE: 113 MMHG | DIASTOLIC BLOOD PRESSURE: 71 MMHG

## 2024-05-30 DIAGNOSIS — G47.33 OSA (OBSTRUCTIVE SLEEP APNEA): Primary | ICD-10-CM

## 2024-05-30 DIAGNOSIS — E11.9 TYPE 2 DIABETES MELLITUS WITHOUT COMPLICATION, WITHOUT LONG-TERM CURRENT USE OF INSULIN (HCC): ICD-10-CM

## 2024-05-30 DIAGNOSIS — I10 ESSENTIAL (PRIMARY) HYPERTENSION: ICD-10-CM

## 2024-05-30 DIAGNOSIS — D50.0 IRON DEFICIENCY ANEMIA DUE TO CHRONIC BLOOD LOSS: ICD-10-CM

## 2024-05-30 DIAGNOSIS — E66.01 CLASS 3 SEVERE OBESITY DUE TO EXCESS CALORIES WITH SERIOUS COMORBIDITY AND BODY MASS INDEX (BMI) OF 45.0 TO 49.9 IN ADULT (HCC): ICD-10-CM

## 2024-05-30 PROBLEM — E66.813 CLASS 3 SEVERE OBESITY DUE TO EXCESS CALORIES WITH SERIOUS COMORBIDITY AND BODY MASS INDEX (BMI) OF 45.0 TO 49.9 IN ADULT: Status: ACTIVE | Noted: 2022-07-26

## 2024-05-30 LAB
ALBUMIN SERPL-MCNC: 3.9 G/DL (ref 3.5–5)
ALBUMIN/GLOB SERPL: 1.2 (ref 1–1.9)
ALP SERPL-CCNC: 40 U/L (ref 35–104)
ALT SERPL-CCNC: 34 U/L (ref 12–65)
ANION GAP SERPL CALC-SCNC: 11 MMOL/L (ref 9–18)
AST SERPL-CCNC: 30 U/L (ref 15–37)
BASOPHILS # BLD: 0 K/UL (ref 0–0.2)
BASOPHILS NFR BLD: 0 % (ref 0–2)
BILIRUB SERPL-MCNC: 0.6 MG/DL (ref 0–1.2)
BUN SERPL-MCNC: 8 MG/DL (ref 6–23)
CALCIUM SERPL-MCNC: 9.2 MG/DL (ref 8.8–10.2)
CHLORIDE SERPL-SCNC: 101 MMOL/L (ref 98–107)
CHOLEST SERPL-MCNC: 139 MG/DL (ref 0–200)
CO2 SERPL-SCNC: 27 MMOL/L (ref 20–28)
CREAT SERPL-MCNC: 0.75 MG/DL (ref 0.6–1.1)
DIFFERENTIAL METHOD BLD: NORMAL
EOSINOPHIL # BLD: 0.1 K/UL (ref 0–0.8)
EOSINOPHIL NFR BLD: 2 % (ref 0.5–7.8)
ERYTHROCYTE [DISTWIDTH] IN BLOOD BY AUTOMATED COUNT: 13.6 % (ref 11.9–14.6)
GLOBULIN SER CALC-MCNC: 3.2 G/DL (ref 2.3–3.5)
GLUCOSE SERPL-MCNC: 86 MG/DL (ref 70–99)
HCT VFR BLD AUTO: 41.6 % (ref 35.8–46.3)
HDLC SERPL-MCNC: 36 MG/DL (ref 40–60)
HDLC SERPL: 3.9 (ref 0–5)
HGB BLD-MCNC: 13.2 G/DL (ref 11.7–15.4)
IMM GRANULOCYTES # BLD AUTO: 0 K/UL (ref 0–0.5)
IMM GRANULOCYTES NFR BLD AUTO: 0 % (ref 0–5)
IRON SATN MFR SERPL: 21 % (ref 20–50)
IRON SERPL-MCNC: 78 UG/DL (ref 35–100)
LDLC SERPL CALC-MCNC: 87 MG/DL (ref 0–100)
LYMPHOCYTES # BLD: 1.8 K/UL (ref 0.5–4.6)
LYMPHOCYTES NFR BLD: 32 % (ref 13–44)
MCH RBC QN AUTO: 26.8 PG (ref 26.1–32.9)
MCHC RBC AUTO-ENTMCNC: 31.7 G/DL (ref 31.4–35)
MCV RBC AUTO: 84.6 FL (ref 82–102)
MONOCYTES # BLD: 0.5 K/UL (ref 0.1–1.3)
MONOCYTES NFR BLD: 8 % (ref 4–12)
NEUTS SEG # BLD: 3.3 K/UL (ref 1.7–8.2)
NEUTS SEG NFR BLD: 58 % (ref 43–78)
NRBC # BLD: 0 K/UL (ref 0–0.2)
PLATELET # BLD AUTO: 258 K/UL (ref 150–450)
PMV BLD AUTO: 11.7 FL (ref 9.4–12.3)
POTASSIUM SERPL-SCNC: 3.8 MMOL/L (ref 3.5–5.1)
PROT SERPL-MCNC: 7.1 G/DL (ref 6.3–8.2)
RBC # BLD AUTO: 4.92 M/UL (ref 4.05–5.2)
SODIUM SERPL-SCNC: 139 MMOL/L (ref 136–145)
TIBC SERPL-MCNC: 377 UG/DL (ref 240–450)
TRIGL SERPL-MCNC: 85 MG/DL (ref 0–150)
UIBC SERPL-MCNC: 299 UG/DL (ref 112–347)
VLDLC SERPL CALC-MCNC: 17 MG/DL (ref 6–23)
WBC # BLD AUTO: 5.7 K/UL (ref 4.3–11.1)

## 2024-05-30 PROCEDURE — 99213 OFFICE O/P EST LOW 20 MIN: CPT | Performed by: NURSE PRACTITIONER

## 2024-05-30 PROCEDURE — 3078F DIAST BP <80 MM HG: CPT | Performed by: NURSE PRACTITIONER

## 2024-05-30 PROCEDURE — 3074F SYST BP LT 130 MM HG: CPT | Performed by: NURSE PRACTITIONER

## 2024-05-30 ASSESSMENT — SLEEP AND FATIGUE QUESTIONNAIRES
HOW LIKELY ARE YOU TO NOD OFF OR FALL ASLEEP WHILE WATCHING TV: WOULD NEVER DOZE
HOW LIKELY ARE YOU TO NOD OFF OR FALL ASLEEP WHILE SITTING AND TALKING TO SOMEONE: WOULD NEVER DOZE
HOW LIKELY ARE YOU TO NOD OFF OR FALL ASLEEP WHEN YOU ARE A PASSENGER IN A CAR FOR AN HOUR WITHOUT A BREAK: WOULD NEVER DOZE
HOW LIKELY ARE YOU TO NOD OFF OR FALL ASLEEP WHILE SITTING INACTIVE IN A PUBLIC PLACE: WOULD NEVER DOZE
HOW LIKELY ARE YOU TO NOD OFF OR FALL ASLEEP WHILE LYING DOWN TO REST IN THE AFTERNOON WHEN CIRCUMSTANCES PERMIT: WOULD NEVER DOZE
HOW LIKELY ARE YOU TO NOD OFF OR FALL ASLEEP IN A CAR, WHILE STOPPED FOR A FEW MINUTES IN TRAFFIC: WOULD NEVER DOZE
ESS TOTAL SCORE: 0
HOW LIKELY ARE YOU TO NOD OFF OR FALL ASLEEP WHILE SITTING AND READING: WOULD NEVER DOZE
HOW LIKELY ARE YOU TO NOD OFF OR FALL ASLEEP WHILE SITTING QUIETLY AFTER LUNCH WITHOUT ALCOHOL: WOULD NEVER DOZE

## 2024-05-30 NOTE — PATIENT INSTRUCTIONS
Continue BiPAP 17/9 cm H2O with nightly compliance  New BiPAP supplies ordered  Recommendations as above  Follow-up in 1 year or sooner if needed

## 2024-05-30 NOTE — PROGRESS NOTES
[facility]  [address]  [place] [address]  [phone]    Patient Name:  Lynn Zelaya  YOB: 1985      Office Visit 5/30/2024    CHIEF COMPLAINT:    Chief Complaint   Patient presents with    Sleep Apnea    Follow-up         HISTORY OF PRESENT ILLNESS:  Patient is a 40 yo female seen today for follow up of SARBJIT.She has a history of sleep apnea with an AHI of 141.5 and desaturations as low as 71%. She is prescribed bipap therapy with a humidifier set at 17/9 cm with a full face mask. Most recent download reveals AHI on PAP therapy is 1.4, leak is median 14.0 and 39.7 at 95th percentile and the hourly usage is 8 hours 30 minutes nightly. The overall use is 3093 hours with days greater than four hours at 364/365. The patient is compliant with the Pap therapy and is feeling better as a result.      Her compliance with BIPAP is excellent.  Her leak level is a little bit elevated but she reports that she never awakens or has any problems with her mask leaking.  States that she awakens every morning refreshed and denies any excessive daytime sleepiness or fatigue.  [place] score 0/24.  She denies any major medical changes since her last visit.  Reports that her weight has consistently been around 300 pounds after losing approximately 60 pounds over the last 2 years.  Her blood pressure is well-controlled today.      [place] Sleepiness Scale      5/30/2024     1:11 PM 5/30/2023    10:16 AM 11/28/2022     1:43 PM   Sleep Medicine   Sitting and reading 0 0 0   Watching TV 0 0 0   Sitting, inactive in a public place (e.g. a theatre or a meeting) 0 0 0   As a passenger in a car for an hour without a break 0 0 0   Lying down to rest in the afternoon when circumstances permit 0 0 1   Sitting and talking to someone 0 0 0   Sitting quietly after a lunch without alcohol 0 0 0   In a car, while stopped for a few minutes in traffic 0 0 0   [place] Sleepiness Score 0 0 1

## 2024-05-31 LAB
EST. AVERAGE GLUCOSE BLD GHB EST-MCNC: 119 MG/DL
HBA1C MFR BLD: 5.8 % (ref 0–5.6)

## 2024-05-31 NOTE — RESULT ENCOUNTER NOTE
Results will be discussed with patient during upcoming office visit.  Please add Hemoglobin A1c on to blood already drawn if able - dx: E11.9 if possible.  Otherwise, add to visit description to do POC in the office.

## 2024-06-03 NOTE — TELEPHONE ENCOUNTER
From: ISMAEL BAILEY  To: Lynn Daniellekira Zelaya  Sent: 5/29/2024 8:21 AM EDT  Subject: appointment on 05/30/2024    Please bring your cpap/bipap machine to your upcoming appointment with Bola Venegas NP on Thursday 5/30/2024.   Thank You!

## 2024-06-07 RX ORDER — SEMAGLUTIDE 0.68 MG/ML
0.5 INJECTION, SOLUTION SUBCUTANEOUS WEEKLY
Qty: 3 ML | Refills: 5 | Status: CANCELLED | OUTPATIENT
Start: 2024-06-07

## 2024-06-07 SDOH — ECONOMIC STABILITY: FOOD INSECURITY: WITHIN THE PAST 12 MONTHS, THE FOOD YOU BOUGHT JUST DIDN'T LAST AND YOU DIDN'T HAVE MONEY TO GET MORE.: NEVER TRUE

## 2024-06-07 SDOH — ECONOMIC STABILITY: HOUSING INSECURITY
IN THE LAST 12 MONTHS, WAS THERE A TIME WHEN YOU DID NOT HAVE A STEADY PLACE TO SLEEP OR SLEPT IN A SHELTER (INCLUDING NOW)?: NO

## 2024-06-07 SDOH — ECONOMIC STABILITY: INCOME INSECURITY: HOW HARD IS IT FOR YOU TO PAY FOR THE VERY BASICS LIKE FOOD, HOUSING, MEDICAL CARE, AND HEATING?: NOT VERY HARD

## 2024-06-07 SDOH — ECONOMIC STABILITY: FOOD INSECURITY: WITHIN THE PAST 12 MONTHS, YOU WORRIED THAT YOUR FOOD WOULD RUN OUT BEFORE YOU GOT MONEY TO BUY MORE.: NEVER TRUE

## 2024-06-07 SDOH — ECONOMIC STABILITY: TRANSPORTATION INSECURITY
IN THE PAST 12 MONTHS, HAS LACK OF TRANSPORTATION KEPT YOU FROM MEETINGS, WORK, OR FROM GETTING THINGS NEEDED FOR DAILY LIVING?: NO

## 2024-06-07 NOTE — PROGRESS NOTES
Lynn Zelaya (:  1985) is a 39 y.o. female,Established patient, here for evaluation of the following chief complaint(s):  Diabetes (Pt is here for a 4 month diabetic f/u with lab review + diabetic foot exam. ), Hypertension, and Iron deficiency anemia      Assessment & Plan   1. Type 2 diabetes mellitus without complication, without long-term current use of insulin (HCC)  -     metFORMIN (GLUCOPHAGE XR) 500 MG extended release tablet; Take 1 tablet by mouth daily (with breakfast), Disp-90 tablet, R-3Normal  -     Comprehensive Metabolic Panel; Future  -      DIABETES FOOT EXAM  2. Essential (primary) hypertension  -     amLODIPine (NORVASC) 10 MG tablet; Take 1 tablet by mouth daily, Disp-90 tablet, R-3Normal  -     valsartan-hydroCHLOROthiazide (DIOVAN HCT) 320-25 MG per tablet; Take 1 tablet by mouth daily, Disp-90 tablet, R-3Normal  -     Comprehensive Metabolic Panel; Future  3. Iron deficiency anemia due to chronic blood loss  -     ferrous sulfate (IRON 325) 325 (65 Fe) MG tablet; Take 1 tablet by mouth daily (with breakfast), Disp-90 tablet, R-3Normal  -     CBC with Auto Differential; Future  -     Ferritin; Future  -     Iron and TIBC; Future  4. Morbid obesity with BMI of 45.0-49.9, adult ([facility])  5. Need for hepatitis C screening test  -     Hepatitis C Antibody; Future      Patient Instructions   Trial increase of Ozempic to 1 mg weekly  Encouraged to participate in weight loss success by making positive changes to her diet with focus on healthier foods and/or avoidance of snacking  Recommend initiating some effort to exercise - even if only 10-15 minutes to start  Continue chronic medications as prescribed  Monitor blood pressure 2-4 times/month and keep record to bring to next appointment  Reminded to stay well hydrated with plenty of water  Advised to stay in communication with sleep medicine provider regarding any issues or barriers that come up with CPAP use to ensure good

## 2024-06-10 ENCOUNTER — OFFICE VISIT (OUTPATIENT)
Dept: INTERNAL MEDICINE CLINIC | Facility: CLINIC | Age: 39
End: 2024-06-10
Payer: COMMERCIAL

## 2024-06-10 VITALS
HEIGHT: 65 IN | BODY MASS INDEX: 48.82 KG/M2 | WEIGHT: 293 LBS | TEMPERATURE: 97.5 F | DIASTOLIC BLOOD PRESSURE: 80 MMHG | SYSTOLIC BLOOD PRESSURE: 132 MMHG | HEART RATE: 77 BPM | OXYGEN SATURATION: 99 %

## 2024-06-10 DIAGNOSIS — D50.0 IRON DEFICIENCY ANEMIA DUE TO CHRONIC BLOOD LOSS: ICD-10-CM

## 2024-06-10 DIAGNOSIS — E11.9 TYPE 2 DIABETES MELLITUS WITHOUT COMPLICATION, WITHOUT LONG-TERM CURRENT USE OF INSULIN (HCC): Primary | ICD-10-CM

## 2024-06-10 DIAGNOSIS — E55.9 VITAMIN D DEFICIENCY: Primary | ICD-10-CM

## 2024-06-10 DIAGNOSIS — I10 ESSENTIAL (PRIMARY) HYPERTENSION: ICD-10-CM

## 2024-06-10 DIAGNOSIS — Z11.59 NEED FOR HEPATITIS C SCREENING TEST: ICD-10-CM

## 2024-06-10 DIAGNOSIS — E66.01 MORBID OBESITY WITH BMI OF 45.0-49.9, ADULT (HCC): ICD-10-CM

## 2024-06-10 PROCEDURE — 3075F SYST BP GE 130 - 139MM HG: CPT | Performed by: PHYSICIAN ASSISTANT

## 2024-06-10 PROCEDURE — 3044F HG A1C LEVEL LT 7.0%: CPT | Performed by: PHYSICIAN ASSISTANT

## 2024-06-10 PROCEDURE — 3079F DIAST BP 80-89 MM HG: CPT | Performed by: PHYSICIAN ASSISTANT

## 2024-06-10 PROCEDURE — 99214 OFFICE O/P EST MOD 30 MIN: CPT | Performed by: PHYSICIAN ASSISTANT

## 2024-06-10 RX ORDER — METFORMIN HYDROCHLORIDE 500 MG/1
500 TABLET, EXTENDED RELEASE ORAL
Qty: 90 TABLET | Refills: 3 | Status: SHIPPED | OUTPATIENT
Start: 2024-06-10

## 2024-06-10 RX ORDER — SEMAGLUTIDE 1.34 MG/ML
1 INJECTION, SOLUTION SUBCUTANEOUS
Qty: 3 ML | Refills: 5 | Status: SHIPPED | OUTPATIENT
Start: 2024-06-10

## 2024-06-10 RX ORDER — FERROUS SULFATE 325(65) MG
325 TABLET ORAL
Qty: 90 TABLET | Refills: 3 | Status: SHIPPED | OUTPATIENT
Start: 2024-06-10

## 2024-06-10 RX ORDER — VALSARTAN AND HYDROCHLOROTHIAZIDE 320; 25 MG/1; MG/1
1 TABLET, FILM COATED ORAL DAILY
Qty: 90 TABLET | Refills: 3 | Status: SHIPPED | OUTPATIENT
Start: 2024-06-10

## 2024-06-10 RX ORDER — AMLODIPINE BESYLATE 10 MG/1
10 TABLET ORAL DAILY
Qty: 90 TABLET | Refills: 3 | Status: SHIPPED | OUTPATIENT
Start: 2024-06-10

## 2024-06-10 ASSESSMENT — PATIENT HEALTH QUESTIONNAIRE - PHQ9
SUM OF ALL RESPONSES TO PHQ9 QUESTIONS 1 & 2: 0
SUM OF ALL RESPONSES TO PHQ QUESTIONS 1-9: 0
SUM OF ALL RESPONSES TO PHQ QUESTIONS 1-9: 0
1. LITTLE INTEREST OR PLEASURE IN DOING THINGS: NOT AT ALL
SUM OF ALL RESPONSES TO PHQ QUESTIONS 1-9: 0
2. FEELING DOWN, DEPRESSED OR HOPELESS: NOT AT ALL
SUM OF ALL RESPONSES TO PHQ QUESTIONS 1-9: 0

## 2024-06-10 NOTE — PATIENT INSTRUCTIONS
Trial increase of Ozempic to 1 mg weekly  Encouraged to participate in weight loss success by making positive changes to her diet with focus on healthier foods and/or avoidance of snacking  Recommend initiating some effort to exercise - even if only 10-15 minutes to start  Continue chronic medications as prescribed  Monitor blood pressure 2-4 times/month and keep record to bring to next appointment  Reminded to stay well hydrated with plenty of water  Advised to stay in communication with sleep medicine provider regarding any issues or barriers that come up with CPAP use to ensure good compliance can be maintained long term   [Parent] : parent [Home] : at home, [unfilled] , at the time of the visit. [Medical Office: ([facility])___] : at the medical office located in  [Mother] : mother [Verbal consent obtained from patient] : the patient, [unfilled] [FreeTextEntry3] : mother [FreeTextEntry8] : Patient initiated communication for concern via HIPAA secure platform  (Telemedicine )  for      covid                using     phone         .Reviewed quarantine instructions and self isolation to limit spread of disease  as per JENNIFER guidelines.  Patient is an established patient and has verbalized consent to be treated via telemedicine .\par per mom \par she had noticed    wed and thursday . 100. temp. He is on his feet.  he is runny nose and his voice is . he is congested head sneezing runny nose.  tested at home test wed and thursday  neg.  then  saturday pos. \par she went to urgent care in [place]. \par dad  after a fall dec 4th [place].  He passed away 21\par  they spoke to the on call and they said it was a mild case and he did not prescribe anything. He  is on tylenol and coricidin cold and flu .

## 2024-06-30 DIAGNOSIS — I10 ESSENTIAL (PRIMARY) HYPERTENSION: ICD-10-CM

## 2024-06-30 RX ORDER — AMLODIPINE BESYLATE 10 MG/1
10 TABLET ORAL DAILY
Qty: 90 TABLET | Refills: 3 | OUTPATIENT
Start: 2024-06-30

## 2024-12-02 ENCOUNTER — LAB (OUTPATIENT)
Dept: INTERNAL MEDICINE CLINIC | Facility: CLINIC | Age: 39
End: 2024-12-02

## 2024-12-02 DIAGNOSIS — I10 ESSENTIAL (PRIMARY) HYPERTENSION: ICD-10-CM

## 2024-12-02 DIAGNOSIS — E11.9 TYPE 2 DIABETES MELLITUS WITHOUT COMPLICATION, WITHOUT LONG-TERM CURRENT USE OF INSULIN (HCC): ICD-10-CM

## 2024-12-02 DIAGNOSIS — E55.9 VITAMIN D DEFICIENCY: ICD-10-CM

## 2024-12-02 DIAGNOSIS — Z11.59 NEED FOR HEPATITIS C SCREENING TEST: ICD-10-CM

## 2024-12-02 DIAGNOSIS — D50.0 IRON DEFICIENCY ANEMIA DUE TO CHRONIC BLOOD LOSS: ICD-10-CM

## 2024-12-02 LAB
25(OH)D3 SERPL-MCNC: 20.7 NG/ML (ref 30–100)
ALBUMIN SERPL-MCNC: 3.9 G/DL (ref 3.5–5)
ALBUMIN/GLOB SERPL: 1.1 (ref 1–1.9)
ALP SERPL-CCNC: 42 U/L (ref 35–104)
ALT SERPL-CCNC: 25 U/L (ref 8–45)
ANION GAP SERPL CALC-SCNC: 11 MMOL/L (ref 7–16)
AST SERPL-CCNC: 24 U/L (ref 15–37)
BASOPHILS # BLD: 0 K/UL (ref 0–0.2)
BASOPHILS NFR BLD: 1 % (ref 0–2)
BILIRUB SERPL-MCNC: 0.8 MG/DL (ref 0–1.2)
BUN SERPL-MCNC: 9 MG/DL (ref 6–23)
CALCIUM SERPL-MCNC: 9.1 MG/DL (ref 8.8–10.2)
CHLORIDE SERPL-SCNC: 101 MMOL/L (ref 98–107)
CO2 SERPL-SCNC: 27 MMOL/L (ref 20–29)
CREAT SERPL-MCNC: 0.84 MG/DL (ref 0.6–1.1)
DIFFERENTIAL METHOD BLD: NORMAL
EOSINOPHIL # BLD: 0.2 K/UL (ref 0–0.8)
EOSINOPHIL NFR BLD: 2 % (ref 0.5–7.8)
ERYTHROCYTE [DISTWIDTH] IN BLOOD BY AUTOMATED COUNT: 13.4 % (ref 11.9–14.6)
FERRITIN SERPL-MCNC: 84 NG/ML (ref 8–388)
GLOBULIN SER CALC-MCNC: 3.4 G/DL (ref 2.3–3.5)
GLUCOSE SERPL-MCNC: 97 MG/DL (ref 70–99)
HCT VFR BLD AUTO: 43.6 % (ref 35.8–46.3)
HCV AB SER QL: NONREACTIVE
HGB BLD-MCNC: 14 G/DL (ref 11.7–15.4)
IMM GRANULOCYTES # BLD AUTO: 0 K/UL (ref 0–0.5)
IMM GRANULOCYTES NFR BLD AUTO: 0 % (ref 0–5)
IRON SATN MFR SERPL: 30 % (ref 20–50)
IRON SERPL-MCNC: 116 UG/DL (ref 35–100)
LYMPHOCYTES # BLD: 2.1 K/UL (ref 0.5–4.6)
LYMPHOCYTES NFR BLD: 32 % (ref 13–44)
MCH RBC QN AUTO: 27.5 PG (ref 26.1–32.9)
MCHC RBC AUTO-ENTMCNC: 32.1 G/DL (ref 31.4–35)
MCV RBC AUTO: 85.5 FL (ref 82–102)
MONOCYTES # BLD: 0.6 K/UL (ref 0.1–1.3)
MONOCYTES NFR BLD: 9 % (ref 4–12)
NEUTS SEG # BLD: 3.6 K/UL (ref 1.7–8.2)
NEUTS SEG NFR BLD: 56 % (ref 43–78)
NRBC # BLD: 0 K/UL (ref 0–0.2)
PLATELET # BLD AUTO: 249 K/UL (ref 150–450)
PMV BLD AUTO: 11 FL (ref 9.4–12.3)
POTASSIUM SERPL-SCNC: 3.6 MMOL/L (ref 3.5–5.1)
PROT SERPL-MCNC: 7.3 G/DL (ref 6.3–8.2)
RBC # BLD AUTO: 5.1 M/UL (ref 4.05–5.2)
SODIUM SERPL-SCNC: 139 MMOL/L (ref 136–145)
TIBC SERPL-MCNC: 381 UG/DL (ref 240–450)
UIBC SERPL-MCNC: 265 UG/DL (ref 112–347)
WBC # BLD AUTO: 6.5 K/UL (ref 4.3–11.1)

## 2024-12-05 LAB
EST. AVERAGE GLUCOSE BLD GHB EST-MCNC: 124 MG/DL
HBA1C MFR BLD: 6 % (ref 0–5.6)

## 2024-12-05 RX ORDER — SEMAGLUTIDE 1.34 MG/ML
1 INJECTION, SOLUTION SUBCUTANEOUS
Qty: 3 ML | Refills: 5 | Status: CANCELLED | OUTPATIENT
Start: 2024-12-05

## 2024-12-05 NOTE — PROGRESS NOTES
Lynn Zelaya (:  1985) is a 39 y.o. female,Established patient, here for evaluation of the following chief complaint(s):  Diabetes (6 month diabetes f/u with lab review. ), Hypertension, and Flu Vaccine          Assessment/Plan  1. Type 2 diabetes mellitus without complication, without long-term current use of insulin (HCC)  -     Influenza, FLUAD Trivalent, (age 65 y+), IM, Preservative Free, 0.5mL  -     Microalbumin / Creatinine Urine Ratio; Future  -     Tirzepatide (MOUNJARO) 5 MG/0.5ML SOAJ; Inject 5 mg into the skin once a week, Disp-2 mL, R-5Print  -     Comprehensive Metabolic Panel; Future  -     Hemoglobin A1C; Future  -     Lipid Panel; Future  2. Need for influenza vaccination  -     Influenza, FLUAD Trivalent, (age 65 y+), IM, Preservative Free, 0.5mL  3. COVID-19 vaccine regimen to maintain immunity declined  4. Iron deficiency anemia due to chronic blood loss  -     Iron and TIBC; Future  -     CBC with Auto Differential; Future  5. Vitamin D deficiency  -     Vitamin D 25 Hydroxy; Future  6. Essential (primary) hypertension  -     Comprehensive Metabolic Panel; Future  7. Morbid obesity with BMI of 50.0-59.9, adult         Patient Instructions   Discussed trial decrease of ferrous sulfate to 3 days/week  Resume OTC vitamin d3 & b12 supplements daily  Trial on Mounjaro 2.5 mg weekly x 1 month then if tolerating with GI issues increase to 5 mg weekly for maintenance  Praised her lifestyle efforts and urged to make these permanent changes in her life  Encouraged a regular exercise routine to help increase metabolism and burn off calories  Reminded that she is due for annual diabetic eye exam   Reviewed that she is eligible for pneumonia vaccine due to diabetes status and should consider  Monitor blood pressure 1-2 times/week and keep record to bring to next appointment      Patient Education       influenza virus vaccine (injection)  Pronunciation:  IN jto SILVIO JACK

## 2024-12-09 ENCOUNTER — OFFICE VISIT (OUTPATIENT)
Dept: INTERNAL MEDICINE CLINIC | Facility: CLINIC | Age: 39
End: 2024-12-09

## 2024-12-09 VITALS
BODY MASS INDEX: 48.82 KG/M2 | HEART RATE: 82 BPM | HEIGHT: 65 IN | DIASTOLIC BLOOD PRESSURE: 78 MMHG | WEIGHT: 293 LBS | TEMPERATURE: 98.2 F | OXYGEN SATURATION: 97 % | SYSTOLIC BLOOD PRESSURE: 128 MMHG

## 2024-12-09 DIAGNOSIS — Z28.21 COVID-19 VACCINE REGIMEN TO MAINTAIN IMMUNITY DECLINED: ICD-10-CM

## 2024-12-09 DIAGNOSIS — E11.9 TYPE 2 DIABETES MELLITUS WITHOUT COMPLICATION, WITHOUT LONG-TERM CURRENT USE OF INSULIN (HCC): Primary | ICD-10-CM

## 2024-12-09 DIAGNOSIS — Z23 NEED FOR INFLUENZA VACCINATION: ICD-10-CM

## 2024-12-09 DIAGNOSIS — I10 ESSENTIAL (PRIMARY) HYPERTENSION: ICD-10-CM

## 2024-12-09 DIAGNOSIS — E55.9 VITAMIN D DEFICIENCY: ICD-10-CM

## 2024-12-09 DIAGNOSIS — E66.01 MORBID OBESITY WITH BMI OF 50.0-59.9, ADULT: ICD-10-CM

## 2024-12-09 DIAGNOSIS — D50.0 IRON DEFICIENCY ANEMIA DUE TO CHRONIC BLOOD LOSS: ICD-10-CM

## 2024-12-09 RX ORDER — TIRZEPATIDE 5 MG/.5ML
5 INJECTION, SOLUTION SUBCUTANEOUS WEEKLY
Qty: 2 ML | Refills: 5 | Status: SHIPPED | OUTPATIENT
Start: 2024-12-09

## 2024-12-09 ASSESSMENT — PATIENT HEALTH QUESTIONNAIRE - PHQ9
SUM OF ALL RESPONSES TO PHQ QUESTIONS 1-9: 0
SUM OF ALL RESPONSES TO PHQ QUESTIONS 1-9: 0
SUM OF ALL RESPONSES TO PHQ9 QUESTIONS 1 & 2: 0
SUM OF ALL RESPONSES TO PHQ QUESTIONS 1-9: 0
SUM OF ALL RESPONSES TO PHQ QUESTIONS 1-9: 0
1. LITTLE INTEREST OR PLEASURE IN DOING THINGS: NOT AT ALL
2. FEELING DOWN, DEPRESSED OR HOPELESS: NOT AT ALL

## 2024-12-09 ASSESSMENT — ENCOUNTER SYMPTOMS
ABDOMINAL PAIN: 1
DIARRHEA: 1

## 2024-12-09 NOTE — PATIENT INSTRUCTIONS
Discussed trial decrease of ferrous sulfate to 3 days/week  Resume OTC vitamin d3 & b12 supplements daily  Trial on Mounjaro 2.5 mg weekly x 1 month then if tolerating with GI issues increase to 5 mg weekly for maintenance  Praised her lifestyle efforts and urged to make these permanent changes in her life  Encouraged a regular exercise routine to help increase metabolism and burn off calories  Reminded that she is due for annual diabetic eye exam   Reviewed that she is eligible for pneumonia vaccine due to diabetes status and should consider  Monitor blood pressure 1-2 times/week and keep record to bring to next appointment      Patient Education        influenza virus vaccine (injection)  Pronunciation:  IN floo EN za VYE christopher VAK seen  Brand:  Afluria PF Pediatric Quadrivalent [id number], Afluria PF Quadrivalent [id number], Afluria Quadrivalent [id number], Fluarix PF Quadrivalent [id number], Flublok Quadrivalent [id number], Flucelvax PF Quadrivalent [id number], Flucelvax Quadrivalent [id number], FluLaval PF Quadrivalent [id number], Fluzone High-Dose Quadrivalent PF [id number], Fluzone PF Pediatric Quadrivalent [id number], Fluzone PF Quadrivalent [id number], Fluzone Quadrivalent [id number]  What is the most important information I should know about this vaccine?  Influenza virus vaccine is made from \"killed viruses\" and will not cause you to become ill with the flu virus.   What is influenza virus vaccine?  Influenza virus (\"the flu\") is a contagious disease caused by a virus that can spread from one person to another through the air or on surfaces. Flu symptoms include fever, chills, tiredness, aches, sore throat, cough, vomiting, and diarrhea. The flu can also cause sinus infections, ear infections, bronchitis, or serious complications such as pneumonia.  Influenza causes thousands of deaths each year, and hundreds of thousands of hospitalizations. Influenza is most dangerous in children, pregnant women, older adults, and

## 2024-12-10 ENCOUNTER — TELEPHONE (OUTPATIENT)
Dept: INTERNAL MEDICINE CLINIC | Facility: CLINIC | Age: 39
End: 2024-12-10

## 2024-12-10 DIAGNOSIS — E11.9 TYPE 2 DIABETES MELLITUS WITHOUT COMPLICATION, WITHOUT LONG-TERM CURRENT USE OF INSULIN (HCC): Primary | ICD-10-CM

## 2024-12-10 RX ORDER — TIRZEPATIDE 2.5 MG/.5ML
0.5 INJECTION, SOLUTION SUBCUTANEOUS
Qty: 2 ML | Refills: 0 | Status: SHIPPED | COMMUNITY
Start: 2024-12-10

## 2024-12-10 NOTE — TELEPHONE ENCOUNTER
DONALD on 12/10/24 @ 0252 to notify pt that I have set aside 1 box of Mounjaro 2.5 mg samples for her to pick-up at her convenience.

## 2025-01-24 SDOH — ECONOMIC STABILITY: INCOME INSECURITY: IN THE LAST 12 MONTHS, WAS THERE A TIME WHEN YOU WERE NOT ABLE TO PAY THE MORTGAGE OR RENT ON TIME?: NO

## 2025-01-24 SDOH — ECONOMIC STABILITY: FOOD INSECURITY: WITHIN THE PAST 12 MONTHS, THE FOOD YOU BOUGHT JUST DIDN'T LAST AND YOU DIDN'T HAVE MONEY TO GET MORE.: NEVER TRUE

## 2025-01-24 SDOH — ECONOMIC STABILITY: FOOD INSECURITY: WITHIN THE PAST 12 MONTHS, YOU WORRIED THAT YOUR FOOD WOULD RUN OUT BEFORE YOU GOT MONEY TO BUY MORE.: NEVER TRUE

## 2025-01-24 SDOH — ECONOMIC STABILITY: TRANSPORTATION INSECURITY
IN THE PAST 12 MONTHS, HAS THE LACK OF TRANSPORTATION KEPT YOU FROM MEDICAL APPOINTMENTS OR FROM GETTING MEDICATIONS?: NO

## 2025-01-27 ENCOUNTER — OFFICE VISIT (OUTPATIENT)
Dept: OBGYN CLINIC | Age: 40
End: 2025-01-27
Payer: COMMERCIAL

## 2025-01-27 VITALS
HEIGHT: 65 IN | SYSTOLIC BLOOD PRESSURE: 134 MMHG | BODY MASS INDEX: 48.82 KG/M2 | WEIGHT: 293 LBS | DIASTOLIC BLOOD PRESSURE: 72 MMHG

## 2025-01-27 DIAGNOSIS — N92.0 MENORRHAGIA WITH REGULAR CYCLE: ICD-10-CM

## 2025-01-27 DIAGNOSIS — Z11.51 SCREENING FOR HUMAN PAPILLOMAVIRUS (HPV): ICD-10-CM

## 2025-01-27 DIAGNOSIS — Z01.419 WELL WOMAN EXAM WITH ROUTINE GYNECOLOGICAL EXAM: Primary | ICD-10-CM

## 2025-01-27 DIAGNOSIS — Z12.4 SCREENING FOR CERVICAL CANCER: ICD-10-CM

## 2025-01-27 PROCEDURE — 99395 PREV VISIT EST AGE 18-39: CPT | Performed by: OBSTETRICS & GYNECOLOGY

## 2025-01-27 PROCEDURE — 3078F DIAST BP <80 MM HG: CPT | Performed by: OBSTETRICS & GYNECOLOGY

## 2025-01-27 PROCEDURE — 3075F SYST BP GE 130 - 139MM HG: CPT | Performed by: OBSTETRICS & GYNECOLOGY

## 2025-01-27 RX ORDER — ACETAMINOPHEN AND CODEINE PHOSPHATE 120; 12 MG/5ML; MG/5ML
1 SOLUTION ORAL DAILY
Qty: 84 TABLET | Refills: 4 | Status: SHIPPED | OUTPATIENT
Start: 2025-01-27

## 2025-01-27 NOTE — PROGRESS NOTES
encounter.  Anurag  This patient has no history of dvt,stroke,migraine w aura ,PE and not a current smoker.  A1c ok  gardisil   \"DEXA ordered\",AT AGE 65          \"Screening olonoscopy ordered\",IF >44YO  DUE          \"Recommend Gardisil immunization\"IF AGE 9-45     }CONTRACEPTION DISCUSSED    Pt to get fasting labs and cardiac/pulm exam w her primary care discussed at around 42 yo  STD CHECK OFFERED IF <30YO  ,MAMMOGRAM SCHEDULED IF >39YO          MOOD DISCUSSED[PT  NOT SUICIDAL] Wellness  HEALTH MEASURE DISCUSSED INCLUDING TEETH/EYE /skin  APPTS       Vit D3 2000units recommended              DIET/EXERCISE /SLEEP    DISCUSSED                SMOKING DISCUSSED PRN      BLADDER CONTROL DISCUSSED and KEGEL exercises literature given   Gardisil discussed and recommended if age 9-44yo

## 2025-01-31 LAB
COLLECTION METHOD: NORMAL
CYTOLOGIST CVX/VAG CYTO: NORMAL
CYTOLOGY CVX/VAG DOC THIN PREP: NORMAL
DATE OF LMP: NORMAL
HPV APTIMA: NEGATIVE
Lab: NORMAL
OTHER PT INFO: NORMAL
PAP SOURCE: NORMAL
PATH REPORT.FINAL DX SPEC: NORMAL
PREV CYTO INFO: NORMAL
PREV TREATMENT RESULTS: NEGATIVE
PREV TREATMENT: NORMAL
STAT OF ADQ CVX/VAG CYTO-IMP: NORMAL

## 2025-02-07 NOTE — PROGRESS NOTES
Lynn Zelaya (:  1985) is a 39 y.o. female,Established patient, here for evaluation of the following chief complaint(s):  Diabetes (2 month diabetes f/u.) and Hypertension          Assessment/Plan  1. Type 2 diabetes mellitus without complication, without long-term current use of insulin (HCC)  2. Morbid obesity with BMI of 50.0-59.9, adult         Patient Instructions   Reviewed benefits and advice on when it would be appropriate to titrate up on Mounjaro dosing if/when her weight loss plateaus  Encouraged her to continue striving to improve diet and incorporate exercise to maximize weight loss benefits  Continue chronic medications as prescribed  Counseled that she needs to utilize a 2nd form of barrier protection or abstinence x 4 weeks after any dosage change in Mounjaro due to decreased effects of birth control during these transition periods      Return in about 2 months (around 4/10/2025) for diabetes f/u w/ fasting labs 3-7 days prior.      Subjective   HPI  The patient is a 39 y.o. female who is seen for follow up of diabetes.  Current monitoring regimen: patient does not check sugars.  Glucose monitoring frequency: 0 times daily  Home blood sugar records: patient does not test  Any episodes of hypoglycemia? no  Known diabetic complications: none  Current diabetic medications include:  Mounjaro 5 mg weekly (Tuesday) .       Last HbA1C:    Lab Results   Component Value Date    LABA1C 6.0 (H) 2024     Lab Results   Component Value Date     2024         Last Lipid Panel:   Lab Results   Component Value Date    CHOL 139 2024    CHOL 150 10/16/2023    CHOL 154 2023     Lab Results   Component Value Date    TRIG 85 2024    TRIG 131 10/16/2023    TRIG 129 2023     Lab Results   Component Value Date    HDL 36 (L) 2024    HDL 37 (L) 10/16/2023    HDL 31 (L) 2023     No components found for: \"LDLCHOLESTEROL\", \"LDLCALC\"  Lab Results   Component

## 2025-02-09 ASSESSMENT — PATIENT HEALTH QUESTIONNAIRE - PHQ9
10. IF YOU CHECKED OFF ANY PROBLEMS, HOW DIFFICULT HAVE THESE PROBLEMS MADE IT FOR YOU TO DO YOUR WORK, TAKE CARE OF THINGS AT HOME, OR GET ALONG WITH OTHER PEOPLE: NOT DIFFICULT AT ALL
SUM OF ALL RESPONSES TO PHQ QUESTIONS 1-9: 0
7. TROUBLE CONCENTRATING ON THINGS, SUCH AS READING THE NEWSPAPER OR WATCHING TELEVISION: NOT AT ALL
5. POOR APPETITE OR OVEREATING: NOT AT ALL
2. FEELING DOWN, DEPRESSED OR HOPELESS: NOT AT ALL
SUM OF ALL RESPONSES TO PHQ QUESTIONS 1-9: 0
9. THOUGHTS THAT YOU WOULD BE BETTER OFF DEAD, OR OF HURTING YOURSELF: NOT AT ALL
4. FEELING TIRED OR HAVING LITTLE ENERGY: NOT AT ALL
5. POOR APPETITE OR OVEREATING: NOT AT ALL
1. LITTLE INTEREST OR PLEASURE IN DOING THINGS: NOT AT ALL
6. FEELING BAD ABOUT YOURSELF - OR THAT YOU ARE A FAILURE OR HAVE LET YOURSELF OR YOUR FAMILY DOWN: NOT AT ALL
SUM OF ALL RESPONSES TO PHQ9 QUESTIONS 1 & 2: 0
SUM OF ALL RESPONSES TO PHQ QUESTIONS 1-9: 0
6. FEELING BAD ABOUT YOURSELF - OR THAT YOU ARE A FAILURE OR HAVE LET YOURSELF OR YOUR FAMILY DOWN: NOT AT ALL
4. FEELING TIRED OR HAVING LITTLE ENERGY: NOT AT ALL
SUM OF ALL RESPONSES TO PHQ QUESTIONS 1-9: 0
3. TROUBLE FALLING OR STAYING ASLEEP: NOT AT ALL
9. THOUGHTS THAT YOU WOULD BE BETTER OFF DEAD, OR OF HURTING YOURSELF: NOT AT ALL
3. TROUBLE FALLING OR STAYING ASLEEP: NOT AT ALL
SUM OF ALL RESPONSES TO PHQ QUESTIONS 1-9: 0
2. FEELING DOWN, DEPRESSED OR HOPELESS: NOT AT ALL
1. LITTLE INTEREST OR PLEASURE IN DOING THINGS: NOT AT ALL
10. IF YOU CHECKED OFF ANY PROBLEMS, HOW DIFFICULT HAVE THESE PROBLEMS MADE IT FOR YOU TO DO YOUR WORK, TAKE CARE OF THINGS AT HOME, OR GET ALONG WITH OTHER PEOPLE: NOT DIFFICULT AT ALL
8. MOVING OR SPEAKING SO SLOWLY THAT OTHER PEOPLE COULD HAVE NOTICED. OR THE OPPOSITE, BEING SO FIGETY OR RESTLESS THAT YOU HAVE BEEN MOVING AROUND A LOT MORE THAN USUAL: NOT AT ALL
8. MOVING OR SPEAKING SO SLOWLY THAT OTHER PEOPLE COULD HAVE NOTICED. OR THE OPPOSITE - BEING SO FIDGETY OR RESTLESS THAT YOU HAVE BEEN MOVING AROUND A LOT MORE THAN USUAL: NOT AT ALL
7. TROUBLE CONCENTRATING ON THINGS, SUCH AS READING THE NEWSPAPER OR WATCHING TELEVISION: NOT AT ALL

## 2025-02-10 ENCOUNTER — OFFICE VISIT (OUTPATIENT)
Dept: INTERNAL MEDICINE CLINIC | Facility: CLINIC | Age: 40
End: 2025-02-10
Payer: COMMERCIAL

## 2025-02-10 VITALS
SYSTOLIC BLOOD PRESSURE: 116 MMHG | BODY MASS INDEX: 48.82 KG/M2 | TEMPERATURE: 97.7 F | HEIGHT: 65 IN | OXYGEN SATURATION: 99 % | DIASTOLIC BLOOD PRESSURE: 70 MMHG | HEART RATE: 74 BPM | WEIGHT: 293 LBS

## 2025-02-10 DIAGNOSIS — E11.9 TYPE 2 DIABETES MELLITUS WITHOUT COMPLICATION, WITHOUT LONG-TERM CURRENT USE OF INSULIN (HCC): Primary | ICD-10-CM

## 2025-02-10 DIAGNOSIS — E66.01 MORBID OBESITY WITH BMI OF 50.0-59.9, ADULT: ICD-10-CM

## 2025-02-10 PROCEDURE — 3074F SYST BP LT 130 MM HG: CPT | Performed by: PHYSICIAN ASSISTANT

## 2025-02-10 PROCEDURE — 3078F DIAST BP <80 MM HG: CPT | Performed by: PHYSICIAN ASSISTANT

## 2025-02-10 PROCEDURE — 99213 OFFICE O/P EST LOW 20 MIN: CPT | Performed by: PHYSICIAN ASSISTANT

## 2025-02-10 ASSESSMENT — ENCOUNTER SYMPTOMS
NAUSEA: 0
CONSTIPATION: 0
ABDOMINAL PAIN: 0

## 2025-02-10 NOTE — PATIENT INSTRUCTIONS
Reviewed benefits and advice on when it would be appropriate to titrate up on Mounjaro dosing if/when her weight loss plateaus  Encouraged her to continue striving to improve diet and incorporate exercise to maximize weight loss benefits  Continue chronic medications as prescribed  Counseled that she needs to utilize a 2nd form of barrier protection or abstinence x 4 weeks after any dosage change in Mounjaro due to decreased effects of birth control during these transition periods

## 2025-03-24 DIAGNOSIS — D50.0 IRON DEFICIENCY ANEMIA DUE TO CHRONIC BLOOD LOSS: ICD-10-CM

## 2025-03-24 RX ORDER — FERROUS SULFATE 325(65) MG
1 TABLET ORAL
Qty: 90 TABLET | Refills: 3 | OUTPATIENT
Start: 2025-03-24

## 2025-04-07 ENCOUNTER — RESULTS FOLLOW-UP (OUTPATIENT)
Dept: INTERNAL MEDICINE CLINIC | Facility: CLINIC | Age: 40
End: 2025-04-07

## 2025-04-07 ENCOUNTER — LAB (OUTPATIENT)
Dept: INTERNAL MEDICINE CLINIC | Facility: CLINIC | Age: 40
End: 2025-04-07

## 2025-04-07 DIAGNOSIS — D50.0 IRON DEFICIENCY ANEMIA DUE TO CHRONIC BLOOD LOSS: ICD-10-CM

## 2025-04-07 DIAGNOSIS — E11.9 TYPE 2 DIABETES MELLITUS WITHOUT COMPLICATION, WITHOUT LONG-TERM CURRENT USE OF INSULIN: ICD-10-CM

## 2025-04-07 DIAGNOSIS — I10 ESSENTIAL (PRIMARY) HYPERTENSION: ICD-10-CM

## 2025-04-07 DIAGNOSIS — E55.9 VITAMIN D DEFICIENCY: ICD-10-CM

## 2025-04-07 LAB
25(OH)D3 SERPL-MCNC: 25.3 NG/ML (ref 30–100)
ALBUMIN SERPL-MCNC: 3.9 G/DL (ref 3.5–5)
ALBUMIN/GLOB SERPL: 1.2 (ref 1–1.9)
ALP SERPL-CCNC: 41 U/L (ref 35–104)
ALT SERPL-CCNC: 27 U/L (ref 8–45)
ANION GAP SERPL CALC-SCNC: 11 MMOL/L (ref 7–16)
AST SERPL-CCNC: 22 U/L (ref 15–37)
BASOPHILS # BLD: 0.03 K/UL (ref 0–0.2)
BASOPHILS NFR BLD: 0.6 % (ref 0–2)
BILIRUB SERPL-MCNC: 0.7 MG/DL (ref 0–1.2)
BUN SERPL-MCNC: 8 MG/DL (ref 6–23)
CALCIUM SERPL-MCNC: 9.2 MG/DL (ref 8.8–10.2)
CHLORIDE SERPL-SCNC: 102 MMOL/L (ref 98–107)
CHOLEST SERPL-MCNC: 156 MG/DL (ref 0–200)
CO2 SERPL-SCNC: 26 MMOL/L (ref 20–29)
CREAT SERPL-MCNC: 0.88 MG/DL (ref 0.6–1.1)
DIFFERENTIAL METHOD BLD: NORMAL
EOSINOPHIL # BLD: 0.19 K/UL (ref 0–0.8)
EOSINOPHIL NFR BLD: 3.6 % (ref 0.5–7.8)
ERYTHROCYTE [DISTWIDTH] IN BLOOD BY AUTOMATED COUNT: 13.6 % (ref 11.9–14.6)
EST. AVERAGE GLUCOSE BLD GHB EST-MCNC: 110 MG/DL
GLOBULIN SER CALC-MCNC: 3.2 G/DL (ref 2.3–3.5)
GLUCOSE SERPL-MCNC: 80 MG/DL (ref 70–99)
HBA1C MFR BLD: 5.5 % (ref 0–5.6)
HCT VFR BLD AUTO: 41.5 % (ref 35.8–46.3)
HDLC SERPL-MCNC: 33 MG/DL (ref 40–60)
HDLC SERPL: 4.8 (ref 0–5)
HGB BLD-MCNC: 14 G/DL (ref 11.7–15.4)
IMM GRANULOCYTES # BLD AUTO: 0.01 K/UL (ref 0–0.5)
IMM GRANULOCYTES NFR BLD AUTO: 0.2 % (ref 0–5)
IRON SATN MFR SERPL: 16 % (ref 20–50)
IRON SERPL-MCNC: 57 UG/DL (ref 35–100)
LDLC SERPL CALC-MCNC: 107 MG/DL (ref 0–100)
LYMPHOCYTES # BLD: 1.81 K/UL (ref 0.5–4.6)
LYMPHOCYTES NFR BLD: 34 % (ref 13–44)
MCH RBC QN AUTO: 27.9 PG (ref 26.1–32.9)
MCHC RBC AUTO-ENTMCNC: 33.7 G/DL (ref 31.4–35)
MCV RBC AUTO: 82.8 FL (ref 82–102)
MONOCYTES # BLD: 0.51 K/UL (ref 0.1–1.3)
MONOCYTES NFR BLD: 9.6 % (ref 4–12)
NEUTS SEG # BLD: 2.78 K/UL (ref 1.7–8.2)
NEUTS SEG NFR BLD: 52 % (ref 43–78)
NRBC # BLD: 0 K/UL (ref 0–0.2)
PLATELET # BLD AUTO: 253 K/UL (ref 150–450)
PMV BLD AUTO: 11.2 FL (ref 9.4–12.3)
POTASSIUM SERPL-SCNC: 3.4 MMOL/L (ref 3.5–5.1)
PROT SERPL-MCNC: 7.1 G/DL (ref 6.3–8.2)
RBC # BLD AUTO: 5.01 M/UL (ref 4.05–5.2)
SODIUM SERPL-SCNC: 140 MMOL/L (ref 136–145)
TIBC SERPL-MCNC: 351 UG/DL (ref 240–450)
TRIGL SERPL-MCNC: 80 MG/DL (ref 0–150)
UIBC SERPL-MCNC: 294 UG/DL (ref 112–347)
VLDLC SERPL CALC-MCNC: 16 MG/DL (ref 6–23)
WBC # BLD AUTO: 5.3 K/UL (ref 4.3–11.1)

## 2025-04-09 NOTE — PROGRESS NOTES
VLDL 26.2 (H) 10/16/2023     Lab Results   Component Value Date    CHOLHDLRATIO 4.8 04/07/2025    CHOLHDLRATIO 3.9 05/30/2024    CHOLHDLRATIO 4.1 10/16/2023       The patient is a 39 y.o. female who is seen for follow-up of hypertension. She is not exercising and is not adherent to low salt diet.  Daily caffiene intake: a known amount (not daily).   Current medication regimen consists of:  Diovan /25 mg daily + Amlodipine 10 mg daily.  Blood pressure is not measured at home.    BP Readings from Last 3 Encounters:   04/14/25 110/76   02/10/25 116/70   01/27/25 134/72       Patient is here for follow-up of vitamin d deficiency.  The current state of this condition is asymptomatic, improved, and no significant medication side effects noted on OTC vitamin d3 2000 iu daily - not taking 5000 iu daily as prescribed, medication compliance issues: didn't realize she purchased a lower dosage.    Vit D, 25-Hydroxy   Date Value Ref Range Status   04/07/2025 25.3 (L) 30.0 - 100.0 ng/mL Final     Comment:     Deficiency         <20.0 ng/mL  Insufficiency       20.0-29.9 ng/mL     12/02/2024 20.7 (L) 30.0 - 100.0 ng/mL Final     Comment:     Deficiency         <20.0 ng/mL  Insufficiency       20.0-29.9 ng/mL     01/23/2024 59.4 30.0 - 100.0 ng/mL Final         Patient is here for follow-up of  iron deficiency anemia.  The current state of this condition is no significant medication side effects noted and suboptimally controlled  on ferrous sulfate 325 mg 3 days/week  - taking as prescribed.    Lab Results   Component Value Date    WBC 5.3 04/07/2025    WBC 6.5 12/02/2024    WBC 5.7 05/30/2024     Lab Results   Component Value Date    HGB 14.0 04/07/2025    HGB 14.0 12/02/2024    HGB 13.2 05/30/2024     Lab Results   Component Value Date    HCT 41.5 04/07/2025    HCT 43.6 12/02/2024    HCT 41.6 05/30/2024     Lab Results   Component Value Date    MCV 82.8 04/07/2025    MCV 85.5 12/02/2024    MCV 84.6 05/30/2024     Lab

## 2025-04-14 ENCOUNTER — OFFICE VISIT (OUTPATIENT)
Dept: INTERNAL MEDICINE CLINIC | Facility: CLINIC | Age: 40
End: 2025-04-14
Payer: COMMERCIAL

## 2025-04-14 VITALS
OXYGEN SATURATION: 97 % | WEIGHT: 290 LBS | BODY MASS INDEX: 48.32 KG/M2 | SYSTOLIC BLOOD PRESSURE: 110 MMHG | DIASTOLIC BLOOD PRESSURE: 76 MMHG | HEIGHT: 65 IN | TEMPERATURE: 98.4 F | HEART RATE: 68 BPM

## 2025-04-14 DIAGNOSIS — E11.9 TYPE 2 DIABETES MELLITUS WITHOUT COMPLICATION, WITHOUT LONG-TERM CURRENT USE OF INSULIN (HCC): ICD-10-CM

## 2025-04-14 DIAGNOSIS — E55.9 VITAMIN D DEFICIENCY: ICD-10-CM

## 2025-04-14 DIAGNOSIS — D50.0 IRON DEFICIENCY ANEMIA DUE TO CHRONIC BLOOD LOSS: ICD-10-CM

## 2025-04-14 DIAGNOSIS — E78.5 HYPERLIPIDEMIA LDL GOAL <100: ICD-10-CM

## 2025-04-14 DIAGNOSIS — E11.9 TYPE 2 DIABETES MELLITUS WITHOUT COMPLICATION, WITHOUT LONG-TERM CURRENT USE OF INSULIN: Primary | ICD-10-CM

## 2025-04-14 DIAGNOSIS — E66.01 MORBID OBESITY WITH BMI OF 45.0-49.9, ADULT: ICD-10-CM

## 2025-04-14 DIAGNOSIS — E87.6 SERUM POTASSIUM DECREASED: ICD-10-CM

## 2025-04-14 DIAGNOSIS — I10 ESSENTIAL (PRIMARY) HYPERTENSION: ICD-10-CM

## 2025-04-14 LAB
CREAT UR-MCNC: 196 MG/DL (ref 28–217)
MICROALBUMIN UR-MCNC: 1.65 MG/DL (ref 0–20)
MICROALBUMIN/CREAT UR-RTO: 8 MG/G (ref 0–30)

## 2025-04-14 PROCEDURE — 3078F DIAST BP <80 MM HG: CPT | Performed by: PHYSICIAN ASSISTANT

## 2025-04-14 PROCEDURE — 3074F SYST BP LT 130 MM HG: CPT | Performed by: PHYSICIAN ASSISTANT

## 2025-04-14 PROCEDURE — 3044F HG A1C LEVEL LT 7.0%: CPT | Performed by: PHYSICIAN ASSISTANT

## 2025-04-14 PROCEDURE — 99214 OFFICE O/P EST MOD 30 MIN: CPT | Performed by: PHYSICIAN ASSISTANT

## 2025-04-14 RX ORDER — TIRZEPATIDE 5 MG/.5ML
5 INJECTION, SOLUTION SUBCUTANEOUS WEEKLY
Qty: 2 ML | Refills: 5 | Status: SHIPPED | OUTPATIENT
Start: 2025-04-14

## 2025-04-14 RX ORDER — AMLODIPINE BESYLATE 10 MG/1
10 TABLET ORAL DAILY
Qty: 90 TABLET | Refills: 3 | Status: SHIPPED | OUTPATIENT
Start: 2025-04-14

## 2025-04-14 RX ORDER — TIRZEPATIDE 2.5 MG/.5ML
5 INJECTION, SOLUTION SUBCUTANEOUS WEEKLY
Qty: 2 ML | Refills: 0 | Status: SHIPPED | COMMUNITY
Start: 2025-04-14

## 2025-04-14 RX ORDER — VALSARTAN AND HYDROCHLOROTHIAZIDE 320; 25 MG/1; MG/1
1 TABLET, FILM COATED ORAL DAILY
Qty: 90 TABLET | Refills: 3 | Status: SHIPPED | OUTPATIENT
Start: 2025-04-14

## 2025-04-14 RX ORDER — FERROUS SULFATE 325(65) MG
325 TABLET ORAL
Qty: 90 TABLET | Refills: 3 | Status: SHIPPED | OUTPATIENT
Start: 2025-04-14

## 2025-04-14 ASSESSMENT — PATIENT HEALTH QUESTIONNAIRE - PHQ9
SUM OF ALL RESPONSES TO PHQ QUESTIONS 1-9: 0
SUM OF ALL RESPONSES TO PHQ QUESTIONS 1-9: 0
2. FEELING DOWN, DEPRESSED OR HOPELESS: NOT AT ALL
SUM OF ALL RESPONSES TO PHQ QUESTIONS 1-9: 0
SUM OF ALL RESPONSES TO PHQ QUESTIONS 1-9: 0
1. LITTLE INTEREST OR PLEASURE IN DOING THINGS: NOT AT ALL

## 2025-04-14 NOTE — PATIENT INSTRUCTIONS
Increase OTC vitamin d3 to 3539-0631 iu daily (double up on remaining 2000 iu capsules until used up then shop for a 5000 iu capsule and just take 1/day)  Increase ferrous sulfate to 4 times/day for additional iron needed  Reminded that she is overdue for diabetic eye exam and needs to get that scheduled ASAP  Monitor blood pressure 2-4 times/month and notify me if it starts to drop < 100/60 or if increased fatigue (without explainable reason) or postural dizziness occurs  Praised her weight loss success  Strongly encouraged to incorporate some exercise into her weekly routine to help further weight loss success  Continue chronic medications as prescribed  Reminded to stay well hydrated with plenty of water  Will check on status of prior authorization denial given that documentation has been provided to demonstrate A1c > 6.5%

## 2025-04-15 NOTE — RESULT ENCOUNTER NOTE
Urine is negative for any microscopic damage to kidneys from the diabetes.  We will plan to repeat this test annually.

## 2025-04-16 ENCOUNTER — TELEPHONE (OUTPATIENT)
Dept: INTERNAL MEDICINE CLINIC | Facility: CLINIC | Age: 40
End: 2025-04-16

## 2025-04-16 NOTE — TELEPHONE ENCOUNTER
Pt notified that we received an approval from her insurance for Mounjaro that is good through 4/15/2028. Pt verbalized understanding. Approval sent to be scanned into pt's chart.

## 2025-05-29 ENCOUNTER — TELEPHONE (OUTPATIENT)
Dept: SLEEP MEDICINE | Age: 40
End: 2025-05-29

## 2025-05-30 ENCOUNTER — OFFICE VISIT (OUTPATIENT)
Dept: SLEEP MEDICINE | Age: 40
End: 2025-05-30
Payer: COMMERCIAL

## 2025-05-30 VITALS
OXYGEN SATURATION: 94 % | BODY MASS INDEX: 46.15 KG/M2 | HEART RATE: 77 BPM | DIASTOLIC BLOOD PRESSURE: 70 MMHG | HEIGHT: 65 IN | SYSTOLIC BLOOD PRESSURE: 114 MMHG | RESPIRATION RATE: 17 BRPM | WEIGHT: 277 LBS

## 2025-05-30 DIAGNOSIS — G47.33 OSA (OBSTRUCTIVE SLEEP APNEA): Primary | ICD-10-CM

## 2025-05-30 DIAGNOSIS — E66.813 CLASS 3 SEVERE OBESITY DUE TO EXCESS CALORIES WITH SERIOUS COMORBIDITY AND BODY MASS INDEX (BMI) OF 45.0 TO 49.9 IN ADULT (HCC): ICD-10-CM

## 2025-05-30 PROCEDURE — 3074F SYST BP LT 130 MM HG: CPT | Performed by: NURSE PRACTITIONER

## 2025-05-30 PROCEDURE — 99213 OFFICE O/P EST LOW 20 MIN: CPT | Performed by: NURSE PRACTITIONER

## 2025-05-30 PROCEDURE — G2211 COMPLEX E/M VISIT ADD ON: HCPCS | Performed by: NURSE PRACTITIONER

## 2025-05-30 PROCEDURE — 3078F DIAST BP <80 MM HG: CPT | Performed by: NURSE PRACTITIONER

## 2025-05-30 ASSESSMENT — SLEEP AND FATIGUE QUESTIONNAIRES
HOW LIKELY ARE YOU TO NOD OFF OR FALL ASLEEP WHILE SITTING AND READING: WOULD NEVER DOZE
HOW LIKELY ARE YOU TO NOD OFF OR FALL ASLEEP WHILE LYING DOWN TO REST IN THE AFTERNOON WHEN CIRCUMSTANCES PERMIT: WOULD NEVER DOZE
HOW LIKELY ARE YOU TO NOD OFF OR FALL ASLEEP WHILE SITTING QUIETLY AFTER LUNCH WITHOUT ALCOHOL: WOULD NEVER DOZE
HOW LIKELY ARE YOU TO NOD OFF OR FALL ASLEEP IN A CAR, WHILE STOPPED FOR A FEW MINUTES IN TRAFFIC: WOULD NEVER DOZE
HOW LIKELY ARE YOU TO NOD OFF OR FALL ASLEEP WHILE WATCHING TV: WOULD NEVER DOZE
ESS TOTAL SCORE: 0
HOW LIKELY ARE YOU TO NOD OFF OR FALL ASLEEP WHILE SITTING AND TALKING TO SOMEONE: WOULD NEVER DOZE
HOW LIKELY ARE YOU TO NOD OFF OR FALL ASLEEP WHEN YOU ARE A PASSENGER IN A CAR FOR AN HOUR WITHOUT A BREAK: WOULD NEVER DOZE
HOW LIKELY ARE YOU TO NOD OFF OR FALL ASLEEP WHILE SITTING INACTIVE IN A PUBLIC PLACE: WOULD NEVER DOZE

## 2025-05-30 NOTE — PROGRESS NOTES
[facility]  [address]  [place] [address]  [phone]    Patient Name:  Lynn Zelaay  YOB: 1985      Office Visit 5/30/2025    CHIEF COMPLAINT:    Chief Complaint   Patient presents with    Sleep Apnea       History of Present Illness  Patient is a 40 y.o. female seen today for follow up of SARBJIT. She has a history of sleep apnea with an AHI of 141.5 and desaturations as low as 71%. She is prescribed bipap therapy with a humidifier set at 17/9 cm with a full face mask. Most recent download reveals AHI on PAP therapy is 2.5, leak is median 8.8 and 27.9 at 95th percentile and the hourly usage is 7 hours 57 minutes nightly. The overall use is 2843 hours with days greater than four hours at 358/365. The patient is compliant with the Pap therapy and is feeling better as a result.    She has been managing her condition effectively with the use of a BiPAP machine, which he reports using without any issues. She has experienced significant weight loss, shedding approximately 30 pounds over the past year and an initial 50 pounds within the first six months of BiPAP therapy. Her sleep quality is satisfactory, and he does not experience daytime sleepiness.  Beaumont score is 0/24.  However, she has noticed an increase in mask leakage, which he attributes to changes in his facial structure due to weight loss. She currently uses a medium-sized full face mask and has procured covers for additional comfort. She reports no recent hospitalizations over the last year.     Beaumont Sleepiness Scale      5/30/2025    10:02 AM 5/30/2024     1:11 PM 5/30/2023    10:16 AM 11/28/2022     1:43 PM   Sleep Medicine   Sitting and reading 0 0 0 0   Watching TV 0 0 0 0   Sitting, inactive in a public place (e.g. a theatre or a meeting) 0 0 0 0   As a passenger in a car for an hour without a break 0 0 0 0   Lying down to rest in the afternoon when circumstances permit 0 0 0 1   Sitting and

## 2025-06-10 ENCOUNTER — PATIENT MESSAGE (OUTPATIENT)
Dept: INTERNAL MEDICINE CLINIC | Facility: CLINIC | Age: 40
End: 2025-06-10

## 2025-06-10 DIAGNOSIS — E11.9 TYPE 2 DIABETES MELLITUS WITHOUT COMPLICATION, WITHOUT LONG-TERM CURRENT USE OF INSULIN (HCC): Primary | ICD-10-CM

## 2025-06-10 DIAGNOSIS — G47.33 OBSTRUCTIVE SLEEP APNEA TREATED WITH BIPAP: ICD-10-CM

## 2025-08-08 ENCOUNTER — LAB (OUTPATIENT)
Dept: INTERNAL MEDICINE CLINIC | Facility: CLINIC | Age: 40
End: 2025-08-08

## 2025-08-08 DIAGNOSIS — D50.0 IRON DEFICIENCY ANEMIA DUE TO CHRONIC BLOOD LOSS: ICD-10-CM

## 2025-08-08 DIAGNOSIS — E87.6 SERUM POTASSIUM DECREASED: ICD-10-CM

## 2025-08-08 DIAGNOSIS — E55.9 VITAMIN D DEFICIENCY: ICD-10-CM

## 2025-08-08 DIAGNOSIS — E78.5 HYPERLIPIDEMIA LDL GOAL <100: ICD-10-CM

## 2025-08-08 DIAGNOSIS — E11.9 TYPE 2 DIABETES MELLITUS WITHOUT COMPLICATION, WITHOUT LONG-TERM CURRENT USE OF INSULIN (HCC): ICD-10-CM

## 2025-08-08 DIAGNOSIS — I10 ESSENTIAL (PRIMARY) HYPERTENSION: ICD-10-CM

## 2025-08-08 LAB
25(OH)D3 SERPL-MCNC: 29.4 NG/ML (ref 30–100)
ALBUMIN SERPL-MCNC: 3.7 G/DL (ref 3.5–5)
ALBUMIN/GLOB SERPL: 1.2 (ref 1–1.9)
ALP SERPL-CCNC: 40 U/L (ref 35–104)
ALT SERPL-CCNC: 25 U/L (ref 8–45)
ANION GAP SERPL CALC-SCNC: 14 MMOL/L (ref 7–16)
AST SERPL-CCNC: 24 U/L (ref 15–37)
BASOPHILS # BLD: 0.02 K/UL (ref 0–0.2)
BASOPHILS NFR BLD: 0.3 % (ref 0–2)
BILIRUB SERPL-MCNC: 1.1 MG/DL (ref 0–1.2)
BUN SERPL-MCNC: 13 MG/DL (ref 6–23)
CALCIUM SERPL-MCNC: 9.3 MG/DL (ref 8.8–10.2)
CHLORIDE SERPL-SCNC: 100 MMOL/L (ref 98–107)
CHOLEST SERPL-MCNC: 146 MG/DL (ref 0–200)
CO2 SERPL-SCNC: 23 MMOL/L (ref 20–29)
CREAT SERPL-MCNC: 0.9 MG/DL (ref 0.6–1.1)
DIFFERENTIAL METHOD BLD: NORMAL
EOSINOPHIL # BLD: 0.09 K/UL (ref 0–0.8)
EOSINOPHIL NFR BLD: 1.6 % (ref 0.5–7.8)
ERYTHROCYTE [DISTWIDTH] IN BLOOD BY AUTOMATED COUNT: 13.1 % (ref 11.9–14.6)
EST. AVERAGE GLUCOSE BLD GHB EST-MCNC: 107 MG/DL
FERRITIN SERPL-MCNC: 131 NG/ML (ref 8–388)
GLOBULIN SER CALC-MCNC: 3.2 G/DL (ref 2.3–3.5)
GLUCOSE SERPL-MCNC: 81 MG/DL (ref 70–99)
HBA1C MFR BLD: 5.3 % (ref 0–5.6)
HCT VFR BLD AUTO: 40.7 % (ref 35.8–46.3)
HDLC SERPL-MCNC: 30 MG/DL (ref 40–60)
HDLC SERPL: 4.8 (ref 0–5)
HGB BLD-MCNC: 13.9 G/DL (ref 11.7–15.4)
IMM GRANULOCYTES # BLD AUTO: 0.01 K/UL (ref 0–0.5)
IMM GRANULOCYTES NFR BLD AUTO: 0.2 % (ref 0–5)
IRON SATN MFR SERPL: 25 % (ref 20–50)
IRON SERPL-MCNC: 87 UG/DL (ref 35–100)
LDLC SERPL CALC-MCNC: 97 MG/DL (ref 0–100)
LYMPHOCYTES # BLD: 1.75 K/UL (ref 0.5–4.6)
LYMPHOCYTES NFR BLD: 30.2 % (ref 13–44)
MCH RBC QN AUTO: 28 PG (ref 26.1–32.9)
MCHC RBC AUTO-ENTMCNC: 34.2 G/DL (ref 31.4–35)
MCV RBC AUTO: 82.1 FL (ref 82–102)
MONOCYTES # BLD: 0.56 K/UL (ref 0.1–1.3)
MONOCYTES NFR BLD: 9.7 % (ref 4–12)
NEUTS SEG # BLD: 3.36 K/UL (ref 1.7–8.2)
NEUTS SEG NFR BLD: 58 % (ref 43–78)
NRBC # BLD: 0 K/UL (ref 0–0.2)
PLATELET # BLD AUTO: 237 K/UL (ref 150–450)
PMV BLD AUTO: 10.9 FL (ref 9.4–12.3)
POTASSIUM SERPL-SCNC: 3.6 MMOL/L (ref 3.5–5.1)
PROT SERPL-MCNC: 7 G/DL (ref 6.3–8.2)
RBC # BLD AUTO: 4.96 M/UL (ref 4.05–5.2)
SODIUM SERPL-SCNC: 137 MMOL/L (ref 136–145)
TIBC SERPL-MCNC: 347 UG/DL (ref 240–450)
TRIGL SERPL-MCNC: 96 MG/DL (ref 0–150)
UIBC SERPL-MCNC: 260 UG/DL (ref 112–347)
VLDLC SERPL CALC-MCNC: 19 MG/DL (ref 6–23)
WBC # BLD AUTO: 5.8 K/UL (ref 4.3–11.1)

## 2025-08-12 ASSESSMENT — ENCOUNTER SYMPTOMS: SHORTNESS OF BREATH: 0

## 2025-08-14 ENCOUNTER — OFFICE VISIT (OUTPATIENT)
Dept: INTERNAL MEDICINE CLINIC | Facility: CLINIC | Age: 40
End: 2025-08-14
Payer: COMMERCIAL

## 2025-08-14 VITALS
WEIGHT: 264 LBS | SYSTOLIC BLOOD PRESSURE: 108 MMHG | DIASTOLIC BLOOD PRESSURE: 74 MMHG | HEART RATE: 74 BPM | HEIGHT: 65 IN | TEMPERATURE: 98.1 F | OXYGEN SATURATION: 97 % | BODY MASS INDEX: 43.99 KG/M2

## 2025-08-14 DIAGNOSIS — E66.01 MORBID OBESITY WITH BMI OF 40.0-44.9, ADULT (HCC): ICD-10-CM

## 2025-08-14 DIAGNOSIS — E78.5 HYPERLIPIDEMIA LDL GOAL <100: ICD-10-CM

## 2025-08-14 DIAGNOSIS — R42 POSTURAL DIZZINESS: ICD-10-CM

## 2025-08-14 DIAGNOSIS — E55.9 VITAMIN D DEFICIENCY: ICD-10-CM

## 2025-08-14 DIAGNOSIS — I10 ESSENTIAL (PRIMARY) HYPERTENSION: ICD-10-CM

## 2025-08-14 DIAGNOSIS — E11.9 TYPE 2 DIABETES MELLITUS WITHOUT COMPLICATION, WITHOUT LONG-TERM CURRENT USE OF INSULIN (HCC): Primary | ICD-10-CM

## 2025-08-14 DIAGNOSIS — D50.0 IRON DEFICIENCY ANEMIA DUE TO CHRONIC BLOOD LOSS: ICD-10-CM

## 2025-08-14 PROCEDURE — 3044F HG A1C LEVEL LT 7.0%: CPT | Performed by: PHYSICIAN ASSISTANT

## 2025-08-14 PROCEDURE — 3078F DIAST BP <80 MM HG: CPT | Performed by: PHYSICIAN ASSISTANT

## 2025-08-14 PROCEDURE — 99214 OFFICE O/P EST MOD 30 MIN: CPT | Performed by: PHYSICIAN ASSISTANT

## 2025-08-14 PROCEDURE — 3074F SYST BP LT 130 MM HG: CPT | Performed by: PHYSICIAN ASSISTANT

## 2025-08-14 ASSESSMENT — ENCOUNTER SYMPTOMS
NAUSEA: 0
ABDOMINAL DISTENTION: 0
CONSTIPATION: 0
ABDOMINAL PAIN: 0